# Patient Record
Sex: FEMALE | Race: BLACK OR AFRICAN AMERICAN | NOT HISPANIC OR LATINO | Employment: FULL TIME | URBAN - METROPOLITAN AREA
[De-identification: names, ages, dates, MRNs, and addresses within clinical notes are randomized per-mention and may not be internally consistent; named-entity substitution may affect disease eponyms.]

---

## 2018-03-03 ENCOUNTER — HISTORICAL (OUTPATIENT)
Dept: ADMINISTRATIVE | Facility: HOSPITAL | Age: 23
End: 2018-03-03

## 2018-03-03 LAB
HAV IGM SERPL QL IA: NONREACTIVE
HBV CORE IGM SERPL QL IA: NONREACTIVE
HBV SURFACE AG SERPL QL IA: NEGATIVE
HCV AB SERPL QL IA: NONREACTIVE
HIV 1+2 AB+HIV1 P24 AG SERPL QL IA: NONREACTIVE
POC BETA-HCG (QUAL): NEGATIVE
T PALLIDUM AB SER QL: NONREACTIVE

## 2018-08-23 ENCOUNTER — HISTORICAL (OUTPATIENT)
Dept: ADMINISTRATIVE | Facility: HOSPITAL | Age: 23
End: 2018-08-23

## 2018-08-23 LAB
CHOLEST SERPL-MCNC: 169 MG/DL
CHOLEST/HDLC SERPL: 2.6 {RATIO} (ref 0–4.4)
EST. AVERAGE GLUCOSE BLD GHB EST-MCNC: 103 MG/DL
HAV IGM SERPL QL IA: NONREACTIVE
HBA1C MFR BLD: 5.2 % (ref 4.2–6.3)
HBV CORE IGM SERPL QL IA: NONREACTIVE
HBV SURFACE AG SERPL QL IA: NEGATIVE
HCV AB SERPL QL IA: NONREACTIVE
HDLC SERPL-MCNC: 64 MG/DL
HIV 1+2 AB+HIV1 P24 AG SERPL QL IA: NONREACTIVE
LDLC SERPL CALC-MCNC: 89 MG/DL (ref 0–130)
POC BETA-HCG (QUAL): NEGATIVE
T PALLIDUM AB SER QL: NONREACTIVE
T4 FREE SERPL-MCNC: 1.08 NG/DL (ref 0.76–1.46)
TRIGL SERPL-MCNC: 82 MG/DL
TSH SERPL-ACNC: 1.26 MIU/L (ref 0.36–3.74)
VLDLC SERPL CALC-MCNC: 16 MG/DL

## 2018-10-03 LAB — POC BETA-HCG (QUAL): NEGATIVE

## 2019-01-23 LAB — POC BETA-HCG (QUAL): NEGATIVE

## 2019-02-22 ENCOUNTER — HISTORICAL (OUTPATIENT)
Dept: ADMINISTRATIVE | Facility: HOSPITAL | Age: 24
End: 2019-02-22

## 2019-02-22 LAB
ABS NEUT (OLG): 3.54 X10(3)/MCL (ref 2.1–9.2)
ALBUMIN SERPL-MCNC: 3.7 GM/DL (ref 3.4–5)
ALBUMIN/GLOB SERPL: 0.9 RATIO (ref 1.1–2)
ALP SERPL-CCNC: 57 UNIT/L (ref 45–117)
ALT SERPL-CCNC: 19 UNIT/L (ref 12–78)
APTT PPP: 27.4 SECOND(S) (ref 23.3–37)
AST SERPL-CCNC: 15 UNIT/L (ref 15–37)
BASOPHILS # BLD AUTO: 0.02 X10(3)/MCL
BASOPHILS NFR BLD AUTO: 0 %
BILIRUB SERPL-MCNC: 0.4 MG/DL (ref 0.2–1)
BILIRUBIN DIRECT+TOT PNL SERPL-MCNC: 0.1 MG/DL
BILIRUBIN DIRECT+TOT PNL SERPL-MCNC: 0.3 MG/DL
BUN SERPL-MCNC: 8 MG/DL (ref 7–18)
CALCIUM SERPL-MCNC: 8.8 MG/DL (ref 8.5–10.1)
CHLORIDE SERPL-SCNC: 108 MMOL/L (ref 98–107)
CO2 SERPL-SCNC: 25 MMOL/L (ref 21–32)
CREAT SERPL-MCNC: 0.8 MG/DL (ref 0.6–1.3)
EOSINOPHIL # BLD AUTO: 0.17 X10(3)/MCL
EOSINOPHIL NFR BLD AUTO: 2 %
ERYTHROCYTE [DISTWIDTH] IN BLOOD BY AUTOMATED COUNT: 11.9 % (ref 11.5–14.5)
GLOBULIN SER-MCNC: 4.3 GM/ML (ref 2.3–3.5)
GLUCOSE SERPL-MCNC: 74 MG/DL (ref 74–106)
HCT VFR BLD AUTO: 40 % (ref 35–46)
HGB BLD-MCNC: 13.7 GM/DL (ref 12–16)
IMM GRANULOCYTES # BLD AUTO: 0.02 10*3/UL
IMM GRANULOCYTES NFR BLD AUTO: 0 %
INR PPP: 1 (ref 0.9–1.2)
LYMPHOCYTES # BLD AUTO: 2.8 X10(3)/MCL
LYMPHOCYTES NFR BLD AUTO: 40 % (ref 13–40)
MCH RBC QN AUTO: 31.2 PG (ref 26–34)
MCHC RBC AUTO-ENTMCNC: 34.3 GM/DL (ref 31–37)
MCV RBC AUTO: 91.1 FL (ref 80–100)
MONOCYTES # BLD AUTO: 0.42 X10(3)/MCL
MONOCYTES NFR BLD AUTO: 6 % (ref 4–12)
NEUTROPHILS # BLD AUTO: 3.54 X10(3)/MCL
NEUTROPHILS NFR BLD AUTO: 51 X10(3)/MCL
PHOSPHOLIPID AB COM-LC: NORMAL
PHOSPHOLIPID AB INT-LC: NEGATIVE
PLATELET # BLD AUTO: 295 X10(3)/MCL (ref 130–400)
PMV BLD AUTO: 9.7 FL (ref 7.4–10.4)
POTASSIUM SERPL-SCNC: 3.6 MMOL/L (ref 3.5–5.1)
PROT SERPL-MCNC: 8 GM/DL (ref 6.4–8.2)
PROTHROMBIN TIME: 13.1 SECOND(S) (ref 11.9–14.4)
RBC # BLD AUTO: 4.39 X10(6)/MCL (ref 4–5.2)
SODIUM SERPL-SCNC: 138 MMOL/L (ref 136–145)
WBC # SPEC AUTO: 7 X10(3)/MCL (ref 4.5–11)

## 2019-06-27 ENCOUNTER — HISTORICAL (OUTPATIENT)
Dept: RADIOLOGY | Facility: HOSPITAL | Age: 24
End: 2019-06-27

## 2019-10-04 LAB — POC BETA-HCG (QUAL): NEGATIVE

## 2019-11-09 ENCOUNTER — HISTORICAL (OUTPATIENT)
Dept: ADMINISTRATIVE | Facility: HOSPITAL | Age: 24
End: 2019-11-09

## 2019-11-09 LAB
APPEARANCE, UA: ABNORMAL
BACTERIA #/AREA URNS AUTO: ABNORMAL /HPF
BILIRUB SERPL-MCNC: NEGATIVE MG/DL
BILIRUB UR QL STRIP: NEGATIVE
BLOOD URINE, POC: NEGATIVE
CLARITY, POC UA: CLEAR
COLOR UR: YELLOW
COLOR, POC UA: YELLOW
GLUCOSE (UA): NEGATIVE
GLUCOSE UR QL STRIP: NEGATIVE
HGB UR QL STRIP: NEGATIVE
HYALINE CASTS #/AREA URNS LPF: ABNORMAL /LPF
KETONES UR QL STRIP: NEGATIVE
KETONES UR QL STRIP: NEGATIVE
LEUKOCYTE EST, POC UA: NORMAL
LEUKOCYTE ESTERASE UR QL STRIP: 75 LEU/UL
NITRITE UR QL STRIP: NEGATIVE
NITRITE, POC UA: NEGATIVE
PH UR STRIP: 8 [PH] (ref 4.5–8)
PH, POC UA: 8.5
POC BETA-HCG (QUAL): NEGATIVE
PROT UR QL STRIP: NEGATIVE
PROTEIN, POC: NEGATIVE
RBC #/AREA URNS AUTO: ABNORMAL /HPF
SP GR UR STRIP: 1.02 (ref 1–1.03)
SPECIFIC GRAVITY, POC UA: 1.02
SQUAMOUS #/AREA URNS LPF: >100 /LPF
UROBILINOGEN UR STRIP-ACNC: NORMAL
UROBILINOGEN, POC UA: NORMAL
WBC #/AREA URNS AUTO: ABNORMAL /HPF

## 2019-11-11 LAB — FINAL CULTURE: NORMAL

## 2020-02-12 ENCOUNTER — HISTORICAL (OUTPATIENT)
Dept: ADMINISTRATIVE | Facility: HOSPITAL | Age: 25
End: 2020-02-12

## 2020-02-15 LAB — FINAL CULTURE: NORMAL

## 2020-02-26 LAB — POC BETA-HCG (QUAL): NEGATIVE

## 2020-09-24 ENCOUNTER — HISTORICAL (OUTPATIENT)
Dept: ADMINISTRATIVE | Facility: HOSPITAL | Age: 25
End: 2020-09-24

## 2020-09-24 LAB
BILIRUB SERPL-MCNC: NEGATIVE MG/DL
BLOOD URINE, POC: NORMAL
CLARITY, POC UA: CLEAR
COLOR, POC UA: COLORLESS
GLUCOSE UR QL STRIP: NEGATIVE
HAV IGM SERPL QL IA: NONREACTIVE
HBV CORE IGM SERPL QL IA: NONREACTIVE
HBV SURFACE AG SERPL QL IA: NONREACTIVE
HCV AB SERPL QL IA: NONREACTIVE
HIV 1+2 AB+HIV1 P24 AG SERPL QL IA: NONREACTIVE
KETONES UR QL STRIP: NEGATIVE
LEUKOCYTE EST, POC UA: NORMAL
NITRITE, POC UA: NEGATIVE
PH, POC UA: 6.5
PROTEIN, POC: NEGATIVE
SPECIFIC GRAVITY, POC UA: 1
T PALLIDUM AB SER QL: NONREACTIVE
UROBILINOGEN, POC UA: NORMAL

## 2020-11-10 ENCOUNTER — HISTORICAL (OUTPATIENT)
Dept: LAB | Facility: HOSPITAL | Age: 25
End: 2020-11-10

## 2020-11-10 LAB
ABS NEUT (OLG): 3.22 X10(3)/MCL (ref 2.1–9.2)
ALBUMIN SERPL-MCNC: 4.2 GM/DL (ref 3.5–5)
ALBUMIN/GLOB SERPL: 1.4 RATIO (ref 1.1–2)
ALP SERPL-CCNC: 68 UNIT/L (ref 40–150)
ALT SERPL-CCNC: 22 UNIT/L (ref 0–55)
AST SERPL-CCNC: 18 UNIT/L (ref 5–34)
BASOPHILS NFR BLD MANUAL: 1 %
BILIRUB SERPL-MCNC: 0.2 MG/DL
BILIRUBIN DIRECT+TOT PNL SERPL-MCNC: 0.1 MG/DL (ref 0–0.5)
BILIRUBIN DIRECT+TOT PNL SERPL-MCNC: 0.1 MG/DL (ref 0–0.8)
BUN SERPL-MCNC: 8 MG/DL (ref 7–18.7)
CALCIUM SERPL-MCNC: 9.3 MG/DL (ref 8.4–10.2)
CHLORIDE SERPL-SCNC: 104 MMOL/L (ref 98–107)
CO2 SERPL-SCNC: 26 MMOL/L (ref 22–29)
CREAT SERPL-MCNC: 0.74 MG/DL (ref 0.55–1.02)
EOSINOPHIL NFR BLD MANUAL: 1 %
ERYTHROCYTE [DISTWIDTH] IN BLOOD BY AUTOMATED COUNT: 12.1 % (ref 11.5–14.5)
GLOBULIN SER-MCNC: 3 GM/DL (ref 2.4–3.5)
GLUCOSE SERPL-MCNC: 96 MG/DL (ref 74–100)
HCT VFR BLD AUTO: 41.8 % (ref 35–46)
HGB BLD-MCNC: 13.9 GM/DL (ref 12–16)
LYMPHOCYTES NFR BLD MANUAL: 66 % (ref 20.5–51.1)
MCH RBC QN AUTO: 32 PG (ref 26–34)
MCHC RBC AUTO-ENTMCNC: 33.3 GM/DL (ref 31–37)
MCV RBC AUTO: 96.3 FL (ref 80–100)
MONOCYTES NFR BLD MANUAL: 4 % (ref 2–9)
NEUTROPHILS NFR BLD MANUAL: 28 % (ref 47–80)
PLATELET # BLD AUTO: 260 X10(3)/MCL (ref 130–400)
PLATELET # BLD EST: NORMAL 10*3/UL
PMV BLD AUTO: 10.2 FL (ref 7.4–10.4)
POTASSIUM SERPL-SCNC: 3.9 MMOL/L (ref 3.5–5.1)
PROT SERPL-MCNC: 7.2 GM/DL (ref 6.4–8.3)
RBC # BLD AUTO: 4.34 X10(6)/MCL (ref 4–5.2)
RBC MORPH BLD: NORMAL
SODIUM SERPL-SCNC: 138 MMOL/L (ref 136–145)
WBC # SPEC AUTO: 8.9 X10(3)/MCL (ref 4.5–11)

## 2020-12-21 ENCOUNTER — HISTORICAL (OUTPATIENT)
Dept: ADMINISTRATIVE | Facility: HOSPITAL | Age: 25
End: 2020-12-21

## 2020-12-21 LAB
BILIRUB SERPL-MCNC: NEGATIVE MG/DL
BLOOD URINE, POC: NEGATIVE
CLARITY, POC UA: CLEAR
COLOR, POC UA: COLORLESS
FLUAV AG UPPER RESP QL IA.RAPID: NEGATIVE
FLUBV AG UPPER RESP QL IA.RAPID: NEGATIVE
GLUCOSE UR QL STRIP: NEGATIVE
KETONES UR QL STRIP: NEGATIVE
LEUKOCYTE EST, POC UA: NEGATIVE
NITRITE, POC UA: NEGATIVE
PH, POC UA: 6.5
PROTEIN, POC: NEGATIVE
SARS-COV-2 RNA RESP QL NAA+PROBE: DETECTED
SPECIFIC GRAVITY, POC UA: 1.01
UROBILINOGEN, POC UA: NORMAL

## 2021-01-06 ENCOUNTER — HISTORICAL (OUTPATIENT)
Dept: ADMINISTRATIVE | Facility: HOSPITAL | Age: 26
End: 2021-01-06

## 2021-01-06 LAB
APPEARANCE, UA: CLEAR
BACTERIA #/AREA URNS AUTO: ABNORMAL /HPF
BILIRUB UR QL STRIP: NEGATIVE
COLOR UR: ABNORMAL
DEPRECATED CALCIDIOL+CALCIFEROL SERPL-MC: 20 NG/ML (ref 30–80)
GLUCOSE (UA): NEGATIVE
HAV IGM SERPL QL IA: NONREACTIVE
HBV CORE IGM SERPL QL IA: NONREACTIVE
HBV SURFACE AG SERPL QL IA: NONREACTIVE
HCV AB SERPL QL IA: NONREACTIVE
HGB UR QL STRIP: NEGATIVE
HIV 1+2 AB+HIV1 P24 AG SERPL QL IA: NONREACTIVE
HYALINE CASTS #/AREA URNS LPF: ABNORMAL /LPF
KETONES UR QL STRIP: NEGATIVE
LEUKOCYTE ESTERASE UR QL STRIP: NEGATIVE
NITRITE UR QL STRIP: NEGATIVE
PH UR STRIP: 8 [PH] (ref 4.5–8)
POC BETA-HCG (QUAL): NEGATIVE
PROT UR QL STRIP: NEGATIVE
RBC #/AREA URNS AUTO: ABNORMAL /HPF
SP GR UR STRIP: 1 (ref 1–1.03)
SQUAMOUS #/AREA URNS LPF: ABNORMAL /LPF
T PALLIDUM AB SER QL: NONREACTIVE
T4 FREE SERPL-MCNC: 1.05 NG/DL (ref 0.7–1.48)
TSH SERPL-ACNC: 1.32 UIU/ML (ref 0.35–4.94)
UROBILINOGEN UR STRIP-ACNC: NORMAL
WBC #/AREA URNS AUTO: ABNORMAL /HPF

## 2021-01-27 ENCOUNTER — HISTORICAL (OUTPATIENT)
Dept: ADMINISTRATIVE | Facility: HOSPITAL | Age: 26
End: 2021-01-27

## 2021-01-27 LAB
BILIRUB SERPL-MCNC: NEGATIVE MG/DL
BLOOD URINE, POC: NORMAL
CLARITY, POC UA: CLEAR
COLOR, POC UA: COLORLESS
GLUCOSE UR QL STRIP: NEGATIVE
KETONES UR QL STRIP: NEGATIVE
LEUKOCYTE EST, POC UA: NORMAL
NITRITE, POC UA: NEGATIVE
PH, POC UA: 5.5
POC BETA-HCG (QUAL): NEGATIVE
PROTEIN, POC: NEGATIVE
SPECIFIC GRAVITY, POC UA: 1
UROBILINOGEN, POC UA: NORMAL

## 2021-01-30 LAB — FINAL CULTURE: NORMAL

## 2021-03-16 LAB — SARS-COV-2 RNA RESP QL NAA+PROBE: NEGATIVE

## 2021-05-26 ENCOUNTER — HISTORICAL (OUTPATIENT)
Dept: ADMINISTRATIVE | Facility: HOSPITAL | Age: 26
End: 2021-05-26

## 2021-05-26 LAB — SARS-COV-2 RNA RESP QL NAA+PROBE: NOT DETECTED

## 2021-07-13 ENCOUNTER — HISTORICAL (OUTPATIENT)
Dept: ADMINISTRATIVE | Facility: HOSPITAL | Age: 26
End: 2021-07-13

## 2021-07-13 LAB
HAV IGM SERPL QL IA: NONREACTIVE
HBV CORE IGM SERPL QL IA: NONREACTIVE
HBV SURFACE AG SERPL QL IA: NONREACTIVE
HCV AB SERPL QL IA: NONREACTIVE
HIV 1+2 AB+HIV1 P24 AG SERPL QL IA: NONREACTIVE
T PALLIDUM AB SER QL: NONREACTIVE

## 2021-07-16 ENCOUNTER — HISTORICAL (OUTPATIENT)
Dept: ADMINISTRATIVE | Facility: HOSPITAL | Age: 26
End: 2021-07-16

## 2021-07-16 LAB — HBV CORE IGM SERPL QL IA: NONREACTIVE

## 2021-08-15 ENCOUNTER — HISTORICAL (OUTPATIENT)
Dept: ADMINISTRATIVE | Facility: HOSPITAL | Age: 26
End: 2021-08-15

## 2021-08-15 LAB
HBV SURFACE AB SER-ACNC: 0.34 MIU/ML
HBV SURFACE AB SERPL IA-ACNC: NONREACTIVE M[IU]/ML

## 2021-10-31 ENCOUNTER — HISTORICAL (OUTPATIENT)
Dept: ADMINISTRATIVE | Facility: HOSPITAL | Age: 26
End: 2021-10-31

## 2021-10-31 LAB
BILIRUB SERPL-MCNC: NEGATIVE MG/DL
BLOOD URINE, POC: NEGATIVE
CLARITY, POC UA: CLEAR
COLOR, POC UA: YELLOW
GLUCOSE UR QL STRIP: NEGATIVE
KETONES UR QL STRIP: NEGATIVE
LEUKOCYTE EST, POC UA: NORMAL
NITRITE, POC UA: NEGATIVE
PH, POC UA: 7
POC BETA-HCG (QUAL): NEGATIVE
PROTEIN, POC: NEGATIVE
SPECIFIC GRAVITY, POC UA: 1.02
UROBILINOGEN, POC UA: NORMAL

## 2021-11-02 LAB — FINAL CULTURE: NO GROWTH

## 2022-04-11 ENCOUNTER — HISTORICAL (OUTPATIENT)
Dept: ADMINISTRATIVE | Facility: HOSPITAL | Age: 27
End: 2022-04-11

## 2022-04-26 VITALS
WEIGHT: 238.31 LBS | BODY MASS INDEX: 42.23 KG/M2 | HEIGHT: 63 IN | SYSTOLIC BLOOD PRESSURE: 117 MMHG | OXYGEN SATURATION: 100 % | DIASTOLIC BLOOD PRESSURE: 71 MMHG

## 2022-05-04 NOTE — HISTORICAL [facility] CERNER
This is a historical note converted from [employer]. Formatting and pictures may have been removed.  Please reference [employer]icolette for original formatting and attached multimedia. Chief Complaint  frequent urine and odor for 4 days  History of Present Illness  25-year-old female presents to clinic complaining of?urinary urgency frequency?and odor for the past?4 days. ?Also reports suprapubic pressure.? Patient would also like a pregnancy test because?she states during sexual intercourse 4 to 5 days ago?the condom broke.? Patient is on birth control but states that her periods have been irregular?and she so?this causes concern for her.? Denies any vaginal discharge.  Review of Systems  Constitutional: negative except as stated in HPI  ?Eye: negative except as stated in HPI  ?ENT: negative except as stated in HPI  ?Respiratory: negative except as stated in HPI  ?Cardiovascular: negative except as stated in HPI  ?Gastrointestinal: negative except as stated in HPI  ?Genitourinary: negative except as stated in HPI  ?Hema/Lymph: negative except as stated in HPI  ?Endocrine: negative except as stated in HPI  ?Immunologic: negative except as stated in HPI  ?Musculoskeletal: negative except as stated in HPI  ?Integumentary: negative except as stated in HPI  ?Neurologic: negative except as stated in HPI  ?All Other ROS_ negative except as stated in HPI  Physical Exam  Vitals & Measurements  T:?36.9? ?C (Oral)? HR:?60(Peripheral)? RR:?20? BP:?110/66? SpO2:?100%?  HT:?160.00?cm? WT:?101.200?kg? BMI:?39.53?  General_well-developed well-nourished in no acute distress  Gastrointestinal_mild suprapubic tenderness  Genitourinary_no CVA tenderness to palpation  Integumentary_no rashes or skin lesions present?  ?  Assessment/Plan  1.?UTI (urinary tract infection)?N39.0  Start the antibiotics today. ?We will culture your urine and call you with the results when they become available. ?Rest and hydrate. ?If your symptoms persist, worsen or you  develop fever, back pain, or belly pain seek medical attention immediately  Ordered:  nitrofurantoin, 100 mg = 1 cap(s), Oral, BID, X 7 day(s), # 14 cap(s), 0 Refill(s), Pharmacy: [facility]/pharmacy #[id number], 160, cm, Height/Length Dosing, 01/27/21 17:11:00 CST, 101.2, kg, Weight Dosing, 01/27/21 17:11:00 CST  Michael Souza, Trich vag [id number], Now collect, Urine, 01/27/21 17:31:00 CST, Stop date 01/27/21 17:31:00 CST, Nurse collect, UTI (urinary tract infection), 01/27/21 17:31:00 CST  Urine Culture [id number], Routine collect, 01/27/21 17:31:00 CST, Urine, Nurse collect, Stop date 01/27/21 17:31:00 CST, UTI (urinary tract infection)  ?  2.?Encounter for screening for bacterial sexually transmitted disease?Z11.3  ?We will call you with the results when they become available  ?   Problem List/Past Medical History  Ongoing  2019-nCoV  DVT - Deep vein thrombosis  Obesity  Historical  DVT - Deep vein thrombosis of lower limb  Procedure/Surgical History  Tonsillectomy and adenoidectomy   Medications  Sandee 0.35 mg oral tablet, See Instructions, 11 refills  Macrobid 100 mg oral capsule, 100 mg= 1 cap(s), Oral, BID  Allergies  penicillin?(hives)  Bactrim?(hives)  clindamycin?(severe itching,dizziness, nausea)  Social History  Abuse/Neglect  No, No, Yes, 01/27/2021  Alcohol  Current, Wine, 1-2 times per month, Alcohol use interferes with work or home: No. Others hurt by drinking: No. Household alcohol concerns: No., 11/09/2019  Employment/School  Employed, 01/23/2019  Exercise  Exercise duration: 60. Exercise frequency: 5-6 times/week., 01/23/2019  Home/Environment  Lives with Father, Mother, Siblings. Living situation: Home/Independent. Alcohol abuse in household: No. Substance abuse in household: No. Smoker in household: No. Injuries/Abuse/Neglect in household: No. Feels unsafe at home: Yes. Safe place to go: Yes. Family/Friends available for support: Yes. Concern for family members at home: No. Major illness in  household: No. Financial concerns: No. TV/Computer concerns: No., 01/23/2019    Never in , 01/27/2021  Nutrition/Health  Type of diet: good. Regular, Caffeine intake amount: rarely. Diet restrictions: no. Vitamin/Supplements: yes. Wants to lose weight: Yes. Sleeping concerns: Yes. Feels highly stressed: Yes., 01/23/2019  Sexual  Sexually active: Yes. Sexually active at age 17 Years. Number of current partners 1. Number of lifetime partners 14. Sexual orientation: Straight or heterosexual. Uses condoms: Yes. History of sexual abuse: No. Gender Identity Identifies as female., 01/23/2019  Spiritual/Cultural  Moravian, 06/13/2019  Substance Use - Denies Substance Abuse, 04/09/2015  Current, Marijuana, 1-2 times per week, 12/21/2020  Tobacco - Denies Tobacco Use, 04/09/2015  Never (less than 100 in lifetime), N/A, 01/27/2021  Family History  ALS: Negative: Father.  Asthma.: Negative: Father.  Bipolar: Negative: Father.  CAD (coronary artery disease): Negative: Father.  COPD: Negative: Father.  Cancer: Negative: Father.  Cataract.: Negative: Father.  DM (diabetes mellitus): Negative: Father.  Depression.: Negative: Father.  Heart attack: Negative: Father.  Hyperlipidemia: Negative: Father.  Hyperlipidemia.: Grandmother.  Hypertension.: Grandfather.Negative: Father.  Kidney failure: Negative: Father.  Liver cirrhosis: Negative: Father.  Lupus: Negative: Father.  Mental retardation: Negative: Father.  Open heart surgery: Grandfather.  Seizure: Negative: Father.  Sickle cell disease.: Negative: Father.  Stroke: Negative: Father.  TB: Negative: Father.  Tobacco use.: Negative: Father.  Immunizations  Vaccine Date Status   influenza virus vaccine, inactivated 01/10/2017 Given   meningococcal conjugate vaccine 02/23/2012 Recorded   influenza virus vaccine, inactivated 01/26/2012 Recorded   human papillomavirus vaccine 10/11/2011 Recorded   human papillomavirus vaccine 07/12/2011 Recorded   human papillomavirus  vaccine 04/12/2011 Recorded   varicella virus vaccine 02/11/2011 Recorded   varicella virus vaccine 01/13/2011 Recorded   tetanus/diphtheria/pertussis, acel(Tdap) 01/13/2011 Recorded   influenza virus vaccine, inactivated 11/02/2010 Recorded   influenza virus vaccine, inactivated 11/03/2009 Recorded   hepatitis A pediatric vaccine 04/09/2008 Recorded   meningococcal conjugate vaccine 01/25/2008 Recorded   poliovirus vaccine, inactivated 03/16/2000 Recorded   measles/mumps/rubella virus vaccine 03/16/2000 Recorded   diphtheria/pertussis, acel/tetanus ped 03/16/2000 Recorded   poliovirus vaccine, inactivated 02/26/1997 Recorded   measles/mumps/rubella virus vaccine 02/26/1997 Recorded   poliovirus vaccine, inactivated 1996 Recorded   hepatitis B pediatric vaccine 1996 Recorded   poliovirus vaccine, inactivated 1996 Recorded   poliovirus vaccine, inactivated 1996 Recorded   hepatitis B pediatric vaccine 1996 Recorded   hepatitis B pediatric vaccine 1996 Recorded   Health Maintenance  Health Maintenance  ???Pending?(in the next year)  ??? ??OverDue  ??? ? ? ?Influenza Vaccine due??10/01/20??and every 1??day(s)  ??? ??Due?  ??? ? ? ?Tetanus Vaccine due??01/13/21??and every 10??year(s)  ??? ??Due In Future?  ??? ? ? ?ADL Screening not due until??12/21/21??and every 1??year(s)  ??? ? ? ?Obesity Screening not due until??01/01/22??and every 1??year(s)  ??? ? ? ?Alcohol Misuse Screening not due until??01/02/22??and every 1??year(s)  ??? ? ? ?Depression Screening not due until??01/06/22??and every 1??year(s)  ???Satisfied?(in the past 1 year)  ??? ??Satisfied?  ??? ? ? ?ADL Screening on??12/21/20.??Satisfied by Lurdes Iglesias LPN  ??? ? ? ?Alcohol Misuse Screening on??01/27/21.??Satisfied by Herlinda Joshi LPN  ??? ? ? ?Blood Pressure Screening on??01/27/21.??Satisfied by Herlinda Joshi LPN  ??? ? ? ?Body Mass Index Check on??01/27/21.??Satisfied by Herlinda Joshi LPN  ??? ? ?  ?Cervical Cancer Screening on??01/06/21.??Satisfied by Kailey Crowe  ??? ? ? ?Depression Screening on??01/06/21.??Satisfied by Eleni Juarez LPN  ??? ? ? ?Diabetes Screening on??11/10/20.??Satisfied by Shaheen Gardner Jr.  ??? ? ? ?Influenza Vaccine on??01/27/21.??Satisfied by Herlinda Joshi LPN  ??? ? ? ?Obesity Screening on??01/27/21.??Satisfied by Herlinda Joshi LPN  ?

## 2022-05-04 NOTE — HISTORICAL [facility] CERNER
This is a historical note converted from [employer]. Formatting and pictures may have been removed.  Please reference [place] for original formatting and attached multimedia. Chief Complaint  Cramping and back pain for 4 days  History of Present Illness  Patient presents to Urgent Care for stated complaint during Covid health crisis.  26-year-old female presents to clinic reporting of a?lower bilateral back pain?that started 3 days ago?as well urinary pressure?and lower abdominal discomfort,?patient?reporting she might feel a UTI,?also she reports she has been exercising lately?and she is thinking?the back pain related to exercising?but denies any incontinence or bowel habit changes, denies any fever or nausea vomiting or diarrhea  Review of Systems  Constitutional: negative except as stated in HPI  Eye: negative except as stated in HPI  ENT: negative except as stated in HPI  Respiratory: negative except as stated in HPI  Cardiovascular: negative except as stated in HPI  Gastrointestinal: negative except as stated in HPI  Genitourinary: negative except as stated in HPI  Hema/Lymph: negative except as stated in HPI  Endocrine: negative except as stated in HPI  Immunologic: negative except as stated in HPI  Musculoskeletal: negative except as stated in HPI  Integumentary: negative except as stated in HPI  Neurologic: negative except as stated in HPI  Physical Exam  Vitals & Measurements  T:?37.1? ?C (Oral)? HR:?65(Peripheral)? RR:?20? BP:?117/69? SpO2:?100%?  HT:?160.00?cm? WT:?104.000?kg? BMI:?40.63? LMP:?09/01/2021 00:00 CDT?  Vital signs: Reviewed  General: in no apparent distress, appropriate for age  Head: no signs of head trauma  Eyes: pupils are reactive. ?Extraocular motions intact, conjunctival pink.?  ENT: Bilateral ear canal clear, no edema, no discharge or bleeding. ?Bilateral TMs intact, pearly gray, no effusion, no retraction or perforation or bulging.  Nose: no sinus tenderness, nasal turbinate normal no  erythema, clear nasal discharge.  Mouth: posterior pharynx-clear, uvula midline.  Neck :no adenopathy, supple, full range of motion, nontender  Respiratory: clear breath sounds bilaterally. ?No wheezing  Cardiac :regular, no murmur  Gastrointestinal: Soft, nontender  Musculoskeletal: Moves all extremities, ambulatory without assistant, spinal vertebral nontender no crepitus no step-off, lumbar region paraspinal muscle skeletal tenderness with palpation, no edema no ecchymosis  :?See HPI  Integumentary: No rashes or skin lesions visible  Neurologic: Cranial nerves grossly intact, no signs of peripheral neurological deficit  Assessment/Plan  1.?Pain in the abdomen?R10.9  Discussed physical exam findings and test results with patient?urine within normal limit but will send for culture due to symptoms,?declined pain medication in the clinic, Rx diclofenac 50 mg 3 times daily as needed for pain, tizanidine?8 mg at bedtime, Macrobid?twice daily for 5 days?will notify of culture results if positive, discussed medication prescribed and its use  Instructed patient to notify his primary care provider regarding the visit today and schedule follow-up appointment in 2 to 3 days if needed  Instructed patient to come back to clinic or go to emergency room department if symptom worsens or no improvement or for any other reasons  Questions elicited and answered  Patient verbalized understanding of discharge instructions, verbalized understand medication prescribed and its use, will read discharge education instructions  Ordered:  nitrofurantoin, 100 mg = 1 cap(s), Oral, BID, X 5 day(s), # 10 cap(s), 0 Refill(s), Pharmacy: [facility]/pharmacy #[id number], Patient Education Provided, Patient Verbalizes Understanding, 160, cm, Height/Length Dosing, 10/31/21 12:34:00 CDT, 104, kg, Weight Dosing, 10/31/21 12:3...  Office/Outpatient Visit Level 4 Established [id number] PC, Pain in the abdomen  Back pain, 10/31/21 13:09:00 CDT  Urine Culture [id number], Now  collect, 10/31/21 13:02:00 CDT, Urine, Nurse collect, Stop date 10/31/21 13:03:00 CDT, Back pain  Pain in the abdomen  ?  2.?Back pain?M54.9,?Back pain?M54.9  ?As discussed in #1  Ordered:  diclofenac, 50 mg = 1 tab(s), Oral, TID, PRN PRN as needed for pain, with food, # 21 tab(s), 0 Refill(s), Pharmacy: [facility]/pharmacy #[id number], Patient Education Provided, Patient Verbalizes Understanding, 160, cm, Height/Length Dosing, 10/31/21 12:34:00 CDT, 104, kg, We...  nitrofurantoin, 100 mg = 1 cap(s), Oral, BID, X 5 day(s), # 10 cap(s), 0 Refill(s), Pharmacy: [facility]/pharmacy #[id number], Patient Education Provided, Patient Verbalizes Understanding, 160, cm, Height/Length Dosing, 10/31/21 12:34:00 CDT, 104, kg, Weight Dosing, 10/31/21 12:3...  tiZANidine, 8 mg = 2 tab(s), Oral, At Bedtime, PRN PRN pain, Do not take while driving or operating machinery or at work or school might cause sleepiness dizziness or drowsiness, # 14 tab(s), 0 Refill(s), Pharmacy: [facility]/pharmacy #[id number], Patient Education Provided,...  Office/Outpatient Visit Level 4 Established [id number] PC, Pain in the abdomen  Back pain, 10/31/21 13:09:00 CDT  Urine Culture [id number], Now collect, 10/31/21 13:02:00 CDT, Urine, Nurse collect, Stop date 10/31/21 13:03:00 CDT, Back pain  Pain in the abdomen  ?   Problem List/Past Medical History  Ongoing  2019-nCoV  DVT - Deep vein thrombosis  Obesity  Historical  DVT - Deep vein thrombosis of lower limb  Procedure/Surgical History  Tonsillectomy and adenoidectomy   Medications  diclofenac sodium 50 mg oral delayed release tablet, 50 mg= 1 tab(s), Oral, TID, PRN  Sandee 0.35 mg oral tablet, See Instructions, 11 refills  Macrobid 100 mg oral capsule, 100 mg= 1 cap(s), Oral, BID  montelukast 10 mg oral TABLET, 10 mg= 1 tab(s), Oral, Daily  tiZANidine 4 mg oral tablet, 8 mg= 2 tab(s), Oral, At Bedtime, PRN  Allergies  penicillin?(hives)  Bactrim?(hives)  clindamycin?(severe itching,dizziness, nausea)  Social History  Abuse/Neglect  No, No, Yes,  10/31/2021  Alcohol  Current, Wine, 1-2 times per month, Alcohol use interferes with work or home: No. Others hurt by drinking: No. Household alcohol concerns: No., 11/09/2019  Employment/School  Employed, 01/23/2019  Exercise  Exercise duration: 60. Exercise frequency: 5-6 times/week., 01/23/2019  Home/Environment  Lives with Father, Mother, Siblings. Living situation: Home/Independent. Alcohol abuse in household: No. Substance abuse in household: No. Smoker in household: No. Injuries/Abuse/Neglect in household: No. Feels unsafe at home: Yes. Safe place to go: Yes. Family/Friends available for support: Yes. Concern for family members at home: No. Major illness in household: No. Financial concerns: No. TV/Computer concerns: No., 01/23/2019    Never in , 01/27/2021  Nutrition/Health  Regular, Good, 08/16/2021  Sexual  Sexually active: Yes. Sexually active at age 17 Years. Number of current partners 1. Number of lifetime partners 14. Sexual orientation: Straight or heterosexual. Uses condoms: Yes. History of sexual abuse: No. Gender Identity Identifies as female., 01/23/2019  Spiritual/Cultural  Jainism, 06/13/2019  Substance Use - Denies Substance Abuse, 04/09/2015  Current, Marijuana, Several times per day, 03/16/2021  Tobacco - Denies Tobacco Use, 04/09/2015  Never (less than 100 in lifetime), N/A, 10/31/2021  Family History  ALS: Negative: Father.  Asthma.: Negative: Father.  Bipolar: Negative: Father.  CAD (coronary artery disease): Negative: Father.  COPD: Negative: Father.  Cancer: Negative: Father.  Cataract.: Negative: Father.  DM (diabetes mellitus): Negative: Father.  Depression.: Negative: Father.  Heart attack: Negative: Father.  Hyperlipidemia: Negative: Father.  Hyperlipidemia.: Grandmother.  Hypertension.: Grandfather.Negative: Father.  Kidney failure: Negative: Father.  Liver cirrhosis: Negative: Father.  Lupus: Negative: Father.  Mental retardation: Negative: Father.  Open heart  surgery: Grandfather.  Seizure: Negative: Father.  Sickle cell disease.: Negative: Father.  Stroke: Negative: Father.  TB: Negative: Father.  Tobacco use.: Negative: Father.  Immunizations  Vaccine Date Status   hepatitis B adult vaccine 09/20/2021 Given   hepatitis B adult vaccine 08/16/2021 Given   tuberculin purified protein derivative 07/19/2021 Given   tetanus/diphtheria/pertussis, acel(Tdap) 07/16/2021 Given   COVID-19 MRNA, LNP-S, PF- Pfizer 06/26/2021 Recorded   COVID-19 MRNA, LNP-S, PF- Pfizer 06/05/2021 Recorded   influenza virus vaccine, inactivated 01/10/2017 Given   meningococcal conjugate vaccine 02/23/2012 Recorded   influenza virus vaccine, inactivated 01/26/2012 Recorded   human papillomavirus vaccine 10/11/2011 Recorded   human papillomavirus vaccine 07/12/2011 Recorded   human papillomavirus vaccine 04/12/2011 Recorded   varicella virus vaccine 02/11/2011 Recorded   varicella virus vaccine 01/13/2011 Recorded   tetanus/diphtheria/pertussis, acel(Tdap) 01/13/2011 Recorded   influenza virus vaccine, inactivated 11/02/2010 Recorded   influenza virus vaccine, inactivated 11/03/2009 Recorded   hepatitis A pediatric vaccine 04/09/2008 Recorded   meningococcal conjugate vaccine 01/25/2008 Recorded   poliovirus vaccine, inactivated 03/16/2000 Recorded   measles/mumps/rubella virus vaccine 03/16/2000 Recorded   diphtheria/pertussis, acel/tetanus ped 03/16/2000 Recorded   poliovirus vaccine, inactivated 02/26/1997 Recorded   measles/mumps/rubella virus vaccine 02/26/1997 Recorded   poliovirus vaccine, inactivated 1996 Recorded   hepatitis B pediatric vaccine 1996 Recorded   poliovirus vaccine, inactivated 1996 Recorded   poliovirus vaccine, inactivated 1996 Recorded   hepatitis B pediatric vaccine 1996 Recorded   hepatitis B pediatric vaccine 1996 Recorded   Health Maintenance  Health Maintenance  ???Pending?(in the next year)  ???There are no current recommendations  pending  ??? ??Due In Future?  ??? ? ? ?Obesity Screening not due until??01/01/22??and every 1??year(s)  ??? ? ? ?Alcohol Misuse Screening not due until??01/02/22??and every 1??year(s)  ??? ? ? ?ADL Screening not due until??08/16/22??and every 1??year(s)  ???Satisfied?(in the past 1 year)  ??? ??Satisfied?  ??? ? ? ?ADL Screening on??08/16/21.??Satisfied by Kayleigh Barrios LPN  ??? ? ? ?Alcohol Misuse Screening on??01/27/21.??Satisfied by Herlinda Joshi LPN.  ??? ? ? ?Blood Pressure Screening on??10/31/21.??Satisfied by Serenity Joshi LPNe JOSIE.  ??? ? ? ?Body Mass Index Check on??10/31/21.??Satisfied by Herlinda Joshi LPN.  ??? ? ? ?Cervical Cancer Screening on??01/06/21.??Satisfied by Kailey Crowe  ??? ? ? ?Depression Screening on??08/16/21.??Satisfied by Kayleigh Barrios LPN  ??? ? ? ?Diabetes Screening on??11/10/20.??Satisfied by Shaheen Gardner Jr.  ??? ? ? ?Influenza Vaccine on??10/31/21.??Satisfied by Herlinda Joshi LPN.  ??? ? ? ?Obesity Screening on??10/31/21.??Satisfied by Serenity Joshi LPNe D.  ??? ? ? ?Tetanus Vaccine on??07/16/21.??Satisfied by Patrick GROSS, Mitul BLUE  ?

## 2022-05-04 NOTE — HISTORICAL [facility]ER
This is a historical note converted from [employer]. Formatting and pictures may have been removed.  Please reference [employer] for original formatting and attached multimedia. Chief Complaint  lower back pain  History of Present Illness  25 yo BF presents to clinic c/o lower back pain. States was working out last night and thinks she overdid it a little bit. Pain with movement, twisting, bending, and lateral bending. Denies fever, urinary symptoms. Denies abdominal pain. Feels soreness to abdomen from working out. Denies taking meds for pain.  [facility]  Review of Systems  General: denies fatigue, fever, chills  GI: denies N/V, diarrhea, bowel incontinence, abd pain  : denies retention, incontinence, frequency, urgency, dysuria, hematuria, hx renal calculi  MS: reports limitation in movement, denies numbness or tingling in lower extremities, denies weakness in lower extremities  ?  ?  ?  Physical Exam  Vitals & Measurements  T:?37.2? ?C (Oral)? HR:?114(Peripheral)? HR:?73(Apical)? RR:?18?  HT:?160?cm? WT:?101.7?kg? BMI:?39.73? LMP:?09/09/2019 00:00 CDT?  General: awake, alert, NADN  Skin: pink, warm, dry and intact  Abd: flat, BSA, NT to palp  Back: pt is able to ambulate to treatment area without assistance.? There is no surface trauma. No abrasions, scars, ecchymosis, lacerations.?+spasm ,?no mass; no PT, step-off or deformity of the bony cervical, thoracic, or L-Spine to firm palpation at the midline. Pain to palpation paravertebral on Left lower lumbar region.  No CVA tenderness to percussion, no SI notch tenderness, no saddle anesthesia.  ROM--able to stand erect  Normal flexion, extension, lateral bending and rotation with limitation?and complaint of pain  Heel and toe walk with good strength.  Dorsi-and plantar flexion with adequate strength.  Straight leg?raises are negative for radiculopathy.  Pain is not in buttock nor does it?radiate  Good dorsalis pedal pulses and posterior tibial  pulses  Sensation to light touch is intact at the great toe web space  N/V intact to lower extremities; strength 5/5  ?  Assessment/Plan  1.?Strain of fascia of lower back?S39.012A  Urine dip=blood (-), (-) glucose, nitrite (-), trace leuk est  Urine pregnancy=negative  Naproxen 500mg (no longer taking Eliquis has been off for a while) po BID x 7 days-do not take Motrin with this medication.  Flexeril 5mg po TID prn spasm  ER precautions for neurological deficits or worsening of pain after 1-2 weeks  Cryotherapy to back.  ROM exercises for back stretching.? Always stretch prior to working out.   Problem List/Past Medical History  Ongoing  DVT - Deep vein thrombosis  Obesity  TMJ tenderness  Tobacco user  Historical  DVT - Deep vein thrombosis of lower limb  Procedure/Surgical History  denies   Medications  Eliquis Starter Pack for Treatment of DVT and PE 5 mg oral tablet, 5 mg= 1 tab(s), Oral, BID,? ?Not Taking per Prescriber  Ortho Micronor 0.35 mg oral tablet, 0.35 mg= 1 tab(s), Oral, Daily, 11 refills  Promethazine DM 6.25 mg-15 mg/5 mL oral syrup, 5 mL, Oral, q6hr, PRN  Allergies  penicillin?(hives)  Bactrim?(hives)  clindamycin?(severe itching,dizziness, nausea)  Social History  Abuse/Neglect  No, 11/23/2019  No, No, 10/11/2019  No, 10/04/2019  Alcohol  Current, Wine, 1-2 times per month, Alcohol use interferes with work or home: No. Others hurt by drinking: No. Household alcohol concerns: No., 11/09/2019  Employment/School  Employed, 01/23/2019  Exercise  Exercise duration: 60. Exercise frequency: 5-6 times/week., 01/23/2019  Home/Environment  Lives with Father, Mother, Siblings. Living situation: Home/Independent. Alcohol abuse in household: No. Substance abuse in household: No. Smoker in household: No. Injuries/Abuse/Neglect in household: No. Feels unsafe at home: Yes. Safe place to go: Yes. Family/Friends available for support: Yes. Concern for family members at home: No. Major illness in household: No.  Financial concerns: No. TV/Computer concerns: No., 01/23/2019  Nutrition/Health  Type of diet: good. Regular, Caffeine intake amount: rarely. Diet restrictions: no. Vitamin/Supplements: yes. Wants to lose weight: Yes. Sleeping concerns: Yes. Feels highly stressed: Yes., 01/23/2019  Sexual  Sexually active: Yes. Sexually active at age 17 Years. Number of current partners 1. Number of lifetime partners 14. Sexual orientation: Straight or heterosexual. Uses condoms: Yes. History of sexual abuse: No. Gender Identity Identifies as female., 01/23/2019  Spiritual/Cultural  Mormon, 06/13/2019  Substance Use - Denies Substance Abuse, 04/09/2015  Current, Marijuana, 10/11/2019  Current, Marijuana, 03/26/2019  Current, Marijuana, Daily, 01/23/2019  Tobacco - Denies Tobacco Use, 04/09/2015  Never (less than 100 in lifetime), N/A, 11/23/2019  Never (less than 100 in lifetime), N/A, Household tobacco concerns: No., 11/09/2019  Family History  ALS: Negative: Father.  Asthma.: Negative: Father.  Bipolar: Negative: Father.  CAD (coronary artery disease): Negative: Father.  COPD: Negative: Father.  Cancer: Negative: Father.  Cataract.: Negative: Father.  DM (diabetes mellitus): Negative: Father.  Depression.: Negative: Father.  Heart attack: Negative: Father.  Hyperlipidemia: Negative: Father.  Hypertension.: Negative: Father.  Kidney failure: Negative: Father.  Liver cirrhosis: Negative: Father.  Lupus: Negative: Father.  Mental retardation: Negative: Father.  Seizure: Negative: Father.  Sickle cell disease.: Negative: Father.  Stroke: Negative: Father.  TB: Negative: Father.  Tobacco use.: Negative: Father.  Immunizations  Vaccine Date Status   influenza virus vaccine, inactivated 01/10/2017 Given   Health Maintenance  Health Maintenance  ???Pending?(in the next year)  ??? ??OverDue  ??? ? ? ?Diabetes Screening due??and every?  ??? ? ? ?Smoking Cessation due??01/01/19??and every 1??year(s)  ??? ??Due?  ??? ? ? ?Influenza Vaccine  due??11/27/19??and every?  ??? ? ? ?Tetanus Vaccine due??11/27/19??and every 10??year(s)  ??? ??Postponed?  ??? ? ? ?Alcohol Misuse Screening due??03/26/20??and every 1??year(s)  ??? ??Due In Future?  ??? ? ? ?Obesity Screening not due until??01/01/20??and every 1??year(s)  ??? ? ? ?ADL Screening not due until??03/01/20??and every 1??year(s)  ??? ? ? ?Blood Pressure Screening not due until??10/03/20??and every 1??year(s)  ??? ? ? ?Depression Screening not due until??10/03/20??and every 1??year(s)  ??? ? ? ?Body Mass Index Check not due until??11/22/20??and every 1??year(s)  ???Satisfied?(in the past 1 year)  ??? ??Satisfied?  ??? ? ? ?ADL Screening on??03/01/19.??Satisfied by Christel Santizo LPN  ??? ? ? ?Blood Pressure Screening on??11/23/19.??Satisfied by Karen Chaudhry RN  ??? ? ? ?Body Mass Index Check on??11/23/19.??Satisfied by Nilo Lancaster  ??? ? ? ?Depression Screening on??10/04/19.??Satisfied by Dao Cardenas LPN  ??? ? ? ?Diabetes Screening on??02/22/19.??Satisfied by Yaz Barrera  ??? ? ? ?Influenza Vaccine on??10/04/19.??Satisfied by Dao Cardenas LPN  ??? ? ? ?Obesity Screening on??11/23/19.??Satisfied by Nilo Lancaster  ??? ??Postponed?  ??? ? ? ?Alcohol Misuse Screening on??03/26/19.??Recorded by Roberto Garner LPN??Reason: Temporary contraindication - reschedule  ?  Lab Results  Test Name Test Result Date/Time   Urine Color Urine Dipstick Yellow 11/09/2019 14:14 CST   Urine Appearance Urine Dipstick Clear 11/09/2019 14:14 CST   pH Urine Dipstick 8.5 11/09/2019 14:14 CST   Specific Gravity Urine Dipstick 1.020 11/09/2019 14:14 CST   Blood Urine Dipstick Negative 11/09/2019 14:14 CST   Glucose Urine Dipstick Negative 11/09/2019 14:14 CST   Ketones Urine Dipstick Negative 11/09/2019 14:14 CST   Protein Urine Dipstick Negative 11/09/2019 14:14 CST   Bilirubin Urine Dipstick Negative 11/09/2019 14:14 CST   Urobilinogen Urine Dipstick 0.2 mg/dl 11/09/2019 14:14 CST   Leukocytes Urine  Dipstick Trace 11/09/2019 14:14 CST   Nitrite Urine Dipstick Negative 11/09/2019 14:14 CST   U beta hCG Ql POC Negative 11/09/2019 14:56 CST

## 2022-06-26 ENCOUNTER — OFFICE VISIT (OUTPATIENT)
Dept: URGENT CARE | Facility: CLINIC | Age: 27
End: 2022-06-26
Payer: MEDICAID

## 2022-06-26 VITALS
HEART RATE: 96 BPM | HEIGHT: 63 IN | TEMPERATURE: 100 F | WEIGHT: 244.88 LBS | RESPIRATION RATE: 18 BRPM | SYSTOLIC BLOOD PRESSURE: 135 MMHG | DIASTOLIC BLOOD PRESSURE: 85 MMHG | BODY MASS INDEX: 43.39 KG/M2 | OXYGEN SATURATION: 100 %

## 2022-06-26 DIAGNOSIS — J06.9 ACUTE URI: Primary | ICD-10-CM

## 2022-06-26 DIAGNOSIS — R68.89 FLU-LIKE SYMPTOMS: ICD-10-CM

## 2022-06-26 DIAGNOSIS — Z11.52 ENCOUNTER FOR SCREENING FOR COVID-19: ICD-10-CM

## 2022-06-26 DIAGNOSIS — J02.9 SORE THROAT: ICD-10-CM

## 2022-06-26 LAB
CTP QC/QA: YES
CTP QC/QA: YES
FLUAV AG NPH QL: NEGATIVE
FLUBV AG NPH QL: NEGATIVE
SARS-COV-2 RDRP RESP QL NAA+PROBE: NEGATIVE
STREP A PCR (OHS): NOT DETECTED

## 2022-06-26 PROCEDURE — 99213 OFFICE O/P EST LOW 20 MIN: CPT | Mod: S$PBB,,, | Performed by: NURSE PRACTITIONER

## 2022-06-26 PROCEDURE — 87631 RESP VIRUS 3-5 TARGETS: CPT | Performed by: NURSE PRACTITIONER

## 2022-06-26 PROCEDURE — 87804 INFLUENZA ASSAY W/OPTIC: CPT | Mod: 59,PBBFAC | Performed by: NURSE PRACTITIONER

## 2022-06-26 PROCEDURE — 99213 PR OFFICE/OUTPT VISIT, EST, LEVL III, 20-29 MIN: ICD-10-PCS | Mod: S$PBB,,, | Performed by: NURSE PRACTITIONER

## 2022-06-26 PROCEDURE — U0002 COVID-19 LAB TEST NON-CDC: HCPCS | Mod: PBBFAC | Performed by: NURSE PRACTITIONER

## 2022-06-26 PROCEDURE — 99213 OFFICE O/P EST LOW 20 MIN: CPT | Mod: PBBFAC | Performed by: NURSE PRACTITIONER

## 2022-06-26 NOTE — PROGRESS NOTES
"Subjective:       Patient ID: Ninfa Fernandez is a 27 y.o. female.    Vitals:  height is 5' 2.99" (1.6 m) and weight is 111.1 kg (244 lb 14.4 oz). Her oral temperature is 99.5 °F (37.5 °C). Her blood pressure is 135/85 and her pulse is 96. Her respiration is 18 and oxygen saturation is 100%.     Chief Complaint: Sore Throat (Entered by patient), Chills, and Generalized Body Aches    Patient is a 27-year-old female, here today for sore throat, nasal congestion, body aches x1 day.  Not taking anything for symptoms.  States she has montelukast and nose spray at home.  The      Constitution: Positive for chills.   HENT: Positive for congestion.    Neck: neck negative.   Cardiovascular: Negative.    Respiratory: Negative.        Objective:      Physical Exam   Constitutional: She is oriented to person, place, and time. She appears well-developed.   HENT:   Head: Normocephalic.   Ears:   Right Ear: Tympanic membrane normal.   Left Ear: Tympanic membrane normal.   Nose: Congestion present.   Eyes: Conjunctivae and EOM are normal. Pupils are equal, round, and reactive to light.   Neck: Neck supple.   Cardiovascular: Normal rate, regular rhythm and normal heart sounds.   Pulmonary/Chest: Effort normal and breath sounds normal.   Musculoskeletal: Normal range of motion.         General: Normal range of motion.   Neurological: She is alert and oriented to person, place, and time.   Skin: Skin is warm and dry.   Psychiatric: Her behavior is normal.   Vitals reviewed.        Assessment:       1. Acute URI    2. Encounter for screening for COVID-19    3. Flu-like symptoms    4. Sore throat            Office Visit on 06/26/2022   Component Date Value Ref Range Status    POC Rapid COVID 06/26/2022 Negative  Negative Final     Acceptable 06/26/2022 Yes   Final    Rapid Influenza A Ag 06/26/2022 Negative  Negative Final    Rapid Influenza B Ag 06/26/2022 Negative  Negative Final     Acceptable " 06/26/2022 Yes   Final        No results found.   Plan:           Medication as ordered. May use humidifier.  If any shortness of breath, wheezing, continued fevers or any new symptoms then immediately go to ER.    Acute URI    Encounter for screening for COVID-19  -     POCT COVID-19 Rapid Screening    Flu-like symptoms  -     POCT Influenza A/B    Sore throat  -     Strep Group A by PCR

## 2022-06-26 NOTE — LETTER
June 26, 2022      [facility]  [address] [address]  Phone: [phone]       Patient: Ninfa Fernandez   YOB: 1995  Date of Visit: 06/26/2022    To Whom It May Concern:    Mike Fernandez  was at [facility] on 06/26/2022. The patient may return to work/school on 06/27/2022 with no restrictions. If you have any questions or concerns, or if I can be of further assistance, please do not hesitate to contact me.    Sincerely,    Luciana Giordano LPN

## 2022-07-30 ENCOUNTER — OFFICE VISIT (OUTPATIENT)
Dept: URGENT CARE | Facility: CLINIC | Age: 27
End: 2022-07-30
Payer: MEDICAID

## 2022-07-30 VITALS
SYSTOLIC BLOOD PRESSURE: 106 MMHG | DIASTOLIC BLOOD PRESSURE: 69 MMHG | TEMPERATURE: 99 F | HEIGHT: 64 IN | BODY MASS INDEX: 42.38 KG/M2 | OXYGEN SATURATION: 100 % | HEART RATE: 66 BPM | RESPIRATION RATE: 18 BRPM | WEIGHT: 248.25 LBS

## 2022-07-30 DIAGNOSIS — Z11.52 ENCOUNTER FOR SCREENING FOR COVID-19: ICD-10-CM

## 2022-07-30 DIAGNOSIS — R10.9 ABDOMINAL PAIN, UNSPECIFIED ABDOMINAL LOCATION: Primary | ICD-10-CM

## 2022-07-30 DIAGNOSIS — R11.0 NAUSEA: ICD-10-CM

## 2022-07-30 LAB
B-HCG UR QL: NEGATIVE
BILIRUB UR QL STRIP: NEGATIVE
CTP QC/QA: YES
FLUAV AG NPH QL: NEGATIVE
FLUBV AG NPH QL: NEGATIVE
GLUCOSE UR QL STRIP: NEGATIVE
KETONES UR QL STRIP: NEGATIVE
LEUKOCYTE ESTERASE UR QL STRIP: NEGATIVE
PH, POC UA: 6.5
POC BLOOD, URINE: NEGATIVE
POC NITRATES, URINE: NEGATIVE
PROT UR QL STRIP: NEGATIVE
SARS-COV-2 RDRP RESP QL NAA+PROBE: NEGATIVE
SP GR UR STRIP: 1.02 (ref 1–1.03)
UROBILINOGEN UR STRIP-ACNC: 0.2 (ref 0.1–1.1)

## 2022-07-30 PROCEDURE — 81025 URINE PREGNANCY TEST: CPT | Mod: PBBFAC

## 2022-07-30 PROCEDURE — 87804 INFLUENZA ASSAY W/OPTIC: CPT | Mod: PBBFAC

## 2022-07-30 PROCEDURE — 99213 OFFICE O/P EST LOW 20 MIN: CPT | Mod: S$PBB,,,

## 2022-07-30 PROCEDURE — U0002 COVID-19 LAB TEST NON-CDC: HCPCS | Mod: PBBFAC

## 2022-07-30 PROCEDURE — 99213 PR OFFICE/OUTPT VISIT, EST, LEVL III, 20-29 MIN: ICD-10-PCS | Mod: S$PBB,,,

## 2022-07-30 PROCEDURE — 99214 OFFICE O/P EST MOD 30 MIN: CPT | Mod: PBBFAC

## 2022-07-30 PROCEDURE — 81003 URINALYSIS AUTO W/O SCOPE: CPT | Mod: PBBFAC

## 2022-07-30 RX ORDER — DICYCLOMINE HYDROCHLORIDE 10 MG/1
10 CAPSULE ORAL 2 TIMES DAILY PRN
Qty: 6 CAPSULE | Refills: 0 | Status: SHIPPED | OUTPATIENT
Start: 2022-07-30 | End: 2022-08-02

## 2022-07-30 RX ORDER — ONDANSETRON 4 MG/1
4 TABLET, ORALLY DISINTEGRATING ORAL EVERY 8 HOURS PRN
Qty: 10 TABLET | Refills: 0 | Status: SHIPPED | OUTPATIENT
Start: 2022-07-30 | End: 2022-08-02

## 2022-07-30 NOTE — LETTER
July 30, 2022      [facility]  [address] [address]  Phone: [phone]       Patient: Ninfa Fernandez   YOB: 1995  Date of Visit: 07/30/2022    To Whom It May Concern:    Mike Fernandez  was at [facility] on 07/30/2022. The patient may return to work/school on AUG 1 2022 with no restrictions. If you have any questions or concerns, or if I can be of further assistance, please do not hesitate to contact me.    Sincerely,    BRANDYN MONTENEGRO NP

## 2022-07-30 NOTE — PROGRESS NOTES
"Subjective:       Patient ID: Ninfa Fernandez is a 27 y.o. female.    Vitals:  height is 5' 4.17" (1.63 m) and weight is 112.6 kg (248 lb 3.8 oz). Her temperature is 98.6 °F (37 °C). Her blood pressure is 106/69 and her pulse is 66. Her respiration is 18 and oxygen saturation is 100%.     Chief Complaint: Abdominal Pain (X today), Nausea, and Diarrhea    27 year old female states stomach cramping, nausea and diarrhea starting today. Denies vomiting or fever. States eating a sandwich this morning from a fast food restaurant and pain after.     Abdominal Pain  Associated symptoms include diarrhea and nausea. Pertinent negatives include no vomiting.   Nausea  Associated symptoms include abdominal pain and nausea. Pertinent negatives include no vomiting.   Diarrhea   Associated symptoms include abdominal pain. Pertinent negatives include no vomiting.       Constitution: Positive for appetite change.   HENT: Negative.    Eyes: Negative.    Respiratory: Negative.    Gastrointestinal: Positive for abdominal pain, nausea and diarrhea. Negative for vomiting.   Endocrine: negative.   Genitourinary: Negative.    Musculoskeletal: Negative.    Skin: Negative.        Objective:      Physical Exam   Constitutional: obesity  HENT:   Head: Normocephalic.   Mouth/Throat: Mucous membranes are moist. Oropharynx is clear.   Eyes: Pupils are equal, round, and reactive to light.   Cardiovascular: Normal rate, regular rhythm and normal pulses.   Pulmonary/Chest: Effort normal and breath sounds normal.   Abdominal: Normal appearance and bowel sounds are normal. She exhibits no distension and no mass. Soft. There is no abdominal tenderness. There is no rebound, no guarding, no left CVA tenderness and no right CVA tenderness. No hernia.   Musculoskeletal: Normal range of motion.         General: Normal range of motion.   Neurological: She is alert.   Skin: Skin is warm and dry.   Psychiatric: Mood normal.   Vitals reviewed.        Results " for orders placed or performed in visit on 07/30/22   POCT Urinalysis, Dipstick, Automated, W/O Scope   Result Value Ref Range    POC Blood, Urine Negative Negative    POC Bilirubin, Urine Negative Negative    POC Urobilinogen, Urine 0.2 0.1 - 1.1    POC Ketones, Urine Negative Negative    POC Protein, Urine Negative Negative    POC Nitrates, Urine Negative Negative    POC Glucose, Urine Negative Negative    pH, UA 6.5     POC Specific Gravity, Urine 1.020 1.003 - 1.029    POC Leukocytes, Urine Negative Negative   POCT urine pregnancy   Result Value Ref Range    POC Preg Test, Ur Negative Negative     Acceptable Yes    POCT COVID-19 Rapid Screening   Result Value Ref Range    POC Rapid COVID Negative Negative     Acceptable Yes    POCT Influenza A/B   Result Value Ref Range    Rapid Influenza A Ag Negative Negative    Rapid Influenza B Ag Negative Negative     Acceptable Yes        Assessment:       1. Abdominal pain, unspecified abdominal location    2. Nausea    3. Encounter for screening for COVID-19          Plan:         Abdominal pain, unspecified abdominal location  -     POCT Urinalysis, Dipstick, Automated, W/O Scope  -     POCT urine pregnancy  -     dicyclomine (BENTYL) 10 MG capsule; Take 1 capsule (10 mg total) by mouth 2 (two) times daily as needed (stomach cramps).  Dispense: 6 capsule; Refill: 0    Nausea  -     POCT Influenza A/B  -     ondansetron (ZOFRAN-ODT) 4 MG TbDL; Take 1 tablet (4 mg total) by mouth every 8 (eight) hours as needed (nausea).  Dispense: 10 tablet; Refill: 0    Encounter for screening for COVID-19  -     POCT COVID-19 Rapid Screening        See patient education for more guidance.    Prescription meds were sent for you - wait 8 hours from the time you left our clinic to take another dose of Ondansetron.     Sanitize your bathroom and all shared surfaces with BLEACH as frequently as you can while ill.     Start back with WATER only.  When you can keep water down for 2 hours, you may try powerade, gatorade, pedialyte or electrolyte beverage of choice.    Do not attempt to eat until this evening, and when you do - follow BLAND diet listed above, today and tomorrow.      Please see provided patient education for guidance.    Go to the ER if you experience chest pain with SOB, SOBOE, high fevers 103+, excessive vomiting/diarrhea, or general distress.

## 2022-09-14 ENCOUNTER — OFFICE VISIT (OUTPATIENT)
Dept: URGENT CARE | Facility: CLINIC | Age: 27
End: 2022-09-14
Payer: MEDICAID

## 2022-09-14 VITALS
SYSTOLIC BLOOD PRESSURE: 121 MMHG | HEART RATE: 59 BPM | OXYGEN SATURATION: 99 % | TEMPERATURE: 98 F | RESPIRATION RATE: 16 BRPM | HEIGHT: 63 IN | BODY MASS INDEX: 43.39 KG/M2 | WEIGHT: 244.88 LBS | DIASTOLIC BLOOD PRESSURE: 79 MMHG

## 2022-09-14 DIAGNOSIS — N92.6 MISSED PERIOD: Primary | ICD-10-CM

## 2022-09-14 DIAGNOSIS — Z11.1 VISIT FOR TB SKIN TEST: ICD-10-CM

## 2022-09-14 DIAGNOSIS — Z02.0 SCHOOL PHYSICAL EXAM: ICD-10-CM

## 2022-09-14 DIAGNOSIS — Z32.00 ENCOUNTER FOR PREGNANCY TEST, RESULT UNKNOWN: ICD-10-CM

## 2022-09-14 LAB
B-HCG UR QL: NEGATIVE
CTP QC/QA: YES

## 2022-09-14 PROCEDURE — 99213 PR OFFICE/OUTPT VISIT, EST, LEVL III, 20-29 MIN: ICD-10-PCS | Mod: S$PBB,,, | Performed by: NURSE PRACTITIONER

## 2022-09-14 PROCEDURE — 99213 OFFICE O/P EST LOW 20 MIN: CPT | Mod: PBBFAC | Performed by: NURSE PRACTITIONER

## 2022-09-14 PROCEDURE — 99213 OFFICE O/P EST LOW 20 MIN: CPT | Mod: S$PBB,,, | Performed by: NURSE PRACTITIONER

## 2022-09-14 PROCEDURE — 81025 URINE PREGNANCY TEST: CPT | Mod: PBBFAC | Performed by: NURSE PRACTITIONER

## 2022-09-14 PROCEDURE — 86580 TB INTRADERMAL TEST: CPT | Mod: PBBFAC

## 2022-09-14 NOTE — PROGRESS NOTES
"Subjective:       Patient ID: Ninfa Fernandez is a 27 y.o. female.    Vitals:  height is 5' 2.99" (1.6 m) and weight is 111.1 kg (244 lb 14.4 oz). Her temperature is 98.2 °F (36.8 °C). Her blood pressure is 121/79 and her pulse is 59 (abnormal). Her respiration is 16 and oxygen saturation is 99%.     Chief Complaint: TB test, Hep B titers    She is attending [facility] and needs a TB skin test and Hep B titers. She is also requesting a flu vaccine if available.     She is requesting a pregnancy test. She has not had a period since she stopped taking birthcontrol a few months ago.        Constitution: Negative for activity change, appetite change and fever.   HENT:  Negative for ear pain, congestion and sore throat.    Eyes:  Negative for eye discharge.   Respiratory:  Negative for cough, shortness of breath and wheezing.    Gastrointestinal:  Negative for abdominal pain, nausea, vomiting, constipation and diarrhea.   Genitourinary:  Positive for missed menses (since stopping birth control a few months ago). Negative for dysuria, frequency, urgency and urine decreased.   Musculoskeletal:  Negative for pain.   Skin:  Negative for rash.   Neurological:  Negative for dizziness and headaches.     Objective:      Physical Exam   Constitutional: She is oriented to person, place, and time.  Non-toxic appearance. No distress. obesity  HENT:   Head: Normocephalic and atraumatic.   Nose: Nose normal. No rhinorrhea or congestion.   Mouth/Throat: Mucous membranes are moist. No oropharyngeal exudate or posterior oropharyngeal erythema. Oropharynx is clear.   Eyes: Pupils are equal, round, and reactive to light.   Neck: Neck supple.   Cardiovascular: Normal rate, regular rhythm and normal pulses.   No murmur heard.  Pulmonary/Chest: Effort normal and breath sounds normal. No respiratory distress.   Abdominal: Normal appearance and bowel sounds are normal. She exhibits no distension. Soft. There is no abdominal tenderness. "   Musculoskeletal: Normal range of motion.         General: Normal range of motion.      Cervical back: She exhibits no tenderness.   Lymphadenopathy:     She has no cervical adenopathy.   Neurological: no focal deficit. She is alert and oriented to person, place, and time.   Skin: Skin is warm and dry. Capillary refill takes less than 2 seconds.   Nursing note and vitals reviewed.      Assessment:       1. Missed period    2. School physical exam    3. Visit for TB skin test    4. Encounter for pregnancy test, result unknown          Plan:         Missed period  -     POCT urine pregnancy    School physical exam  -     Hepatitis B Surface Antibody, Qual/Quant; Future; Expected date: 09/14/2022    Visit for TB skin test  -     POCT TB Skin Test Read  -You must return to clinic in 48-72 hours to have your TB skin test read.   Encounter for pregnancy test, result unknown  -     POCT urine pregnancy       Results for orders placed or performed in visit on 09/14/22   POCT urine pregnancy   Result Value Ref Range    POC Preg Test, Ur Negative Negative     Acceptable Yes

## 2022-09-14 NOTE — PROGRESS NOTES
"Subjective:       Patient ID: Ninfa Fernandez is a 27 y.o. female.    Vitals:  height is 5' 2.99" (1.6 m) and weight is 111.1 kg (244 lb 14.4 oz). Her temperature is 98.2 °F (36.8 °C). Her blood pressure is 121/79 and her pulse is 59 (abnormal). Her respiration is 16 and oxygen saturation is 99%.     Chief Complaint: TB test, Hep B titers    She is attending [facility] and needs a TB Skin Test and Hep B titers. She is also requesting a flu vaccine if available.     She has not had a period in the past few months and she is requesting a pregnancy test.       Constitution: Negative for activity change, appetite change and fever.   HENT:  Negative for ear pain, congestion and sore throat.    Respiratory:  Negative for cough, shortness of breath and wheezing.    Gastrointestinal:  Negative for abdominal pain, nausea, vomiting, constipation and diarrhea.   Genitourinary:  Positive for missed menses (stopped birthcontrol a few months and has not had a period since then). Negative for dysuria, frequency, urgency and urine decreased.   Musculoskeletal:  Negative for pain.   Skin:  Negative for rash.   Neurological:  Negative for dizziness and headaches.     Objective:      Physical Exam   Constitutional: She is oriented to person, place, and time.  Non-toxic appearance. No distress. obesity  HENT:   Head: Normocephalic and atraumatic.   Nose: Nose normal. No rhinorrhea or congestion.   Mouth/Throat: Mucous membranes are moist. No oropharyngeal exudate or posterior oropharyngeal erythema. Oropharynx is clear.   Eyes: Pupils are equal, round, and reactive to light.   Neck: Neck supple.   Cardiovascular: Normal rate, regular rhythm and normal pulses.   No murmur heard.  Pulmonary/Chest: Effort normal and breath sounds normal. No respiratory distress.   Abdominal: Normal appearance and bowel sounds are normal. She exhibits no distension. Soft. There is no abdominal tenderness.   Musculoskeletal: Normal range of motion.         " General: Normal range of motion.      Cervical back: She exhibits no tenderness.   Lymphadenopathy:     She has no cervical adenopathy.   Neurological: no focal deficit. She is alert and oriented to person, place, and time.   Skin: Skin is warm and dry. Capillary refill takes less than 2 seconds.   Nursing note and vitals reviewed.      Assessment:       1. Missed period    2. School physical exam    3. Visit for TB skin test    4. Encounter for pregnancy test, result unknown          Plan:         Missed period  -     POCT urine pregnancy    School physical exam  -     Hepatitis B Surface Antibody, Qual/Quant    Visit for TB skin test  -     POCT TB Skin Test Read  -You must return to clinic in 48-72 hours to have your TB skin test read.     Encounter for pregnancy test, result unknown  -     POCT urine pregnancy       Results for orders placed or performed in visit on 09/14/22   POCT urine pregnancy   Result Value Ref Range    POC Preg Test, Ur Negative Negative     Acceptable Yes

## 2022-09-17 ENCOUNTER — OFFICE VISIT (OUTPATIENT)
Dept: URGENT CARE | Facility: CLINIC | Age: 27
End: 2022-09-17
Payer: MEDICAID

## 2022-09-17 VITALS
BODY MASS INDEX: 43.98 KG/M2 | SYSTOLIC BLOOD PRESSURE: 124 MMHG | HEIGHT: 63 IN | HEART RATE: 65 BPM | TEMPERATURE: 98 F | DIASTOLIC BLOOD PRESSURE: 81 MMHG | RESPIRATION RATE: 18 BRPM | OXYGEN SATURATION: 99 % | WEIGHT: 248.19 LBS

## 2022-09-17 DIAGNOSIS — J06.9 UPPER RESPIRATORY TRACT INFECTION, UNSPECIFIED TYPE: ICD-10-CM

## 2022-09-17 DIAGNOSIS — Z11.52 ENCOUNTER FOR SCREENING FOR COVID-19: Primary | ICD-10-CM

## 2022-09-17 DIAGNOSIS — R68.89 FLU-LIKE SYMPTOMS: ICD-10-CM

## 2022-09-17 LAB
CTP QC/QA: YES
CTP QC/QA: YES
FLUAV AG NPH QL: NEGATIVE
FLUBV AG NPH QL: NEGATIVE
SARS-COV-2 RDRP RESP QL NAA+PROBE: NEGATIVE
TB INDURATION - 48 HR READ: NORMAL
TB INDURATION - 72 HR READ: 0 MM
TB SKIN TEST - 48 HR READ: NORMAL
TB SKIN TEST - 72 HR READ: NEGATIVE

## 2022-09-17 PROCEDURE — U0002 COVID-19 LAB TEST NON-CDC: HCPCS | Mod: PBBFAC | Performed by: FAMILY MEDICINE

## 2022-09-17 PROCEDURE — 99214 OFFICE O/P EST MOD 30 MIN: CPT | Mod: S$PBB,,, | Performed by: FAMILY MEDICINE

## 2022-09-17 PROCEDURE — 87804 INFLUENZA ASSAY W/OPTIC: CPT | Mod: PBBFAC | Performed by: FAMILY MEDICINE

## 2022-09-17 PROCEDURE — 99214 PR OFFICE/OUTPT VISIT, EST, LEVL IV, 30-39 MIN: ICD-10-PCS | Mod: S$PBB,,, | Performed by: FAMILY MEDICINE

## 2022-09-17 PROCEDURE — 99213 OFFICE O/P EST LOW 20 MIN: CPT | Mod: PBBFAC | Performed by: FAMILY MEDICINE

## 2022-09-17 RX ORDER — PREDNISONE 20 MG/1
20 TABLET ORAL DAILY
Qty: 5 TABLET | Refills: 0 | Status: SHIPPED | OUTPATIENT
Start: 2022-09-17 | End: 2022-09-22

## 2022-09-17 NOTE — PROGRESS NOTES
"Subjective:       Patient ID: Ninfa Fernandez is a 27 y.o. female.    Chief Complaint: Headache (Entered by patient) and Facial Pain      HPI  28 yo female with HA and facial pain for 2 days. Has tried OTC medications with no improvement.  No known sick contacts.    Review of Systems   HENT:          As above       Objective:       Vital Signs  Temp: 98.3 °F (36.8 °C)  Temp src: Oral  Pulse: 65  Resp: 18  SpO2: 99 %  BP: 124/81  Height and Weight  Height: 5' 2.99" (160 cm)  Weight: 112.6 kg (248 lb 3.2 oz)  BSA (Calculated - sq m): 2.24 sq meters  BMI (Calculated): 44  Weight in (lb) to have BMI = 25: 140.8]  Physical Exam  Vitals reviewed.   Constitutional:       Appearance: Normal appearance.   HENT:      Head: Normocephalic and atraumatic.      Nose: Congestion present. No rhinorrhea.      Mouth/Throat:      Pharynx: Posterior oropharyngeal erythema present. No oropharyngeal exudate.   Eyes:      Extraocular Movements: Extraocular movements intact.      Conjunctiva/sclera: Conjunctivae normal.   Cardiovascular:      Rate and Rhythm: Normal rate and regular rhythm.      Heart sounds: Normal heart sounds.   Pulmonary:      Breath sounds: Normal breath sounds.   Musculoskeletal:      Cervical back: No tenderness.   Lymphadenopathy:      Cervical: No cervical adenopathy.   Skin:     General: Skin is warm and dry.   Neurological:      General: No focal deficit present.      Mental Status: She is alert.   Psychiatric:         Mood and Affect: Mood and affect normal.         Speech: Speech normal.         Behavior: Behavior normal. Behavior is cooperative.         Thought Content: Thought content does not include homicidal or suicidal ideation.       Assessment:       Problem List Items Addressed This Visit    None  Visit Diagnoses       Encounter for screening for COVID-19    -  Primary    Relevant Medications    predniSONE (DELTASONE) 20 MG tablet    Other Relevant Orders    POCT COVID-19 Rapid Screening (Completed) "    Flu-like symptoms        Relevant Orders    POCT Influenza A/B (Completed)    Upper respiratory tract infection, unspecified type                  Plan:           Swabs negative  Encouraged ibuprofen/acetaminophen as needed  ER precautions  FU with PCP

## 2022-09-17 NOTE — LETTER
September 17, 2022      [facility]  [address] [address]  Phone: [phone]       Patient: Ninfa Fernandez   YOB: 1995  Date of Visit: 09/17/2022    To Whom It May Concern:    Mike Fernandez  was at [facility] on 09/17/2022. The patient may return to work/school on 09/18/2022 with no restrictions. If you have any questions or concerns, or if I can be of further assistance, please do not hesitate to contact me.    Sincerely,    Surjit Duarte MD

## 2022-09-22 ENCOUNTER — HISTORICAL (OUTPATIENT)
Dept: ADMINISTRATIVE | Facility: HOSPITAL | Age: 27
End: 2022-09-22
Payer: MEDICAID

## 2022-10-21 ENCOUNTER — OFFICE VISIT (OUTPATIENT)
Dept: URGENT CARE | Facility: CLINIC | Age: 27
End: 2022-10-21
Payer: MEDICAID

## 2022-10-21 VITALS
BODY MASS INDEX: 42.41 KG/M2 | RESPIRATION RATE: 24 BRPM | WEIGHT: 248.44 LBS | OXYGEN SATURATION: 99 % | DIASTOLIC BLOOD PRESSURE: 76 MMHG | SYSTOLIC BLOOD PRESSURE: 127 MMHG | TEMPERATURE: 102 F | HEIGHT: 64 IN | HEART RATE: 95 BPM

## 2022-10-21 DIAGNOSIS — U07.1 COVID-19: ICD-10-CM

## 2022-10-21 DIAGNOSIS — R52 BODY ACHES: Primary | ICD-10-CM

## 2022-10-21 LAB
CTP QC/QA: YES
CTP QC/QA: YES
FLUAV AG NPH QL: NEGATIVE
FLUBV AG NPH QL: NEGATIVE
SARS-COV-2 RDRP RESP QL NAA+PROBE: POSITIVE

## 2022-10-21 PROCEDURE — 99213 OFFICE O/P EST LOW 20 MIN: CPT | Mod: PBBFAC | Performed by: FAMILY MEDICINE

## 2022-10-21 PROCEDURE — 99214 PR OFFICE/OUTPT VISIT, EST, LEVL IV, 30-39 MIN: ICD-10-PCS | Mod: S$PBB,,, | Performed by: FAMILY MEDICINE

## 2022-10-21 PROCEDURE — 99214 OFFICE O/P EST MOD 30 MIN: CPT | Mod: S$PBB,,, | Performed by: FAMILY MEDICINE

## 2022-10-21 PROCEDURE — 87635 SARS-COV-2 COVID-19 AMP PRB: CPT | Mod: PBBFAC | Performed by: FAMILY MEDICINE

## 2022-10-21 PROCEDURE — 87804 INFLUENZA ASSAY W/OPTIC: CPT | Mod: 59,PBBFAC | Performed by: FAMILY MEDICINE

## 2022-10-21 NOTE — LETTER
October 21, 2022      [facility]  [address] [address]  Phone: [phone]       Patient: Ninfa Fernandez   YOB: 1995  Date of Visit: 10/21/2022    To Whom It May Concern:    Mike Fernandez  was at [facility] on 10/21/2022. The patient may return to work/school on OCT 25 2022 with no restrictions. If you have any questions or concerns, or if I can be of further assistance, please do not hesitate to contact me.    Sincerely,    MANJU CRANDALL MD

## 2022-10-21 NOTE — PROGRESS NOTES
"Subjective:       Patient ID: Ninfa Fernandez is a 27 y.o. female.    Vitals:  height is 5' 4.17" (1.63 m) and weight is 112.7 kg (248 lb 7.3 oz). Her temperature is 102.4 °F (39.1 °C) (abnormal). Her blood pressure is 127/76 and her pulse is 95. Her respiration is 24 (abnormal) and oxygen saturation is 99%.     Chief Complaint: Dizziness, Generalized Body Aches (X 2days), and Fever    Dizziness:    Associated symptoms: a fever.  Fever     2 days of myalgias, fever, minimal cough, no sore throat.  Occasional dizziness.  Nursing student.    Constitution: Positive for fever.   Neurological:  Positive for dizziness.     Constitutional: negative except as stated in HPI  Eye: negative except as stated in HPI  ENT: negative except as stated in HPI  Respiratory: negative except as stated in HPI  Cardiovascular: negative except as stated in HPI  Gastrointestinal: negative except as stated in HPI  Genitourinary: negative except as stated in HPI  Objective:      Physical Exam   Constitutional: She appears well-developed.   HENT:   Head: Atraumatic.   Nose: No rhinorrhea or purulent discharge. Right sinus exhibits no maxillary sinus tenderness and no frontal sinus tenderness. Left sinus exhibits no maxillary sinus tenderness and no frontal sinus tenderness.   Mouth/Throat: Oropharynx is clear and moist.   Eyes: Conjunctivae are normal.   Neck: Neck supple.   Cardiovascular: Regular rhythm.   Pulmonary/Chest: Effort normal and breath sounds normal.   Lymphadenopathy:     She has no cervical adenopathy.   Neurological: She is alert.   Skin: Skin is warm and dry.   Nursing note and vitals reviewed.      Results for orders placed or performed in visit on 10/21/22   POCT COVID-19 Rapid Screening   Result Value Ref Range    POC Rapid COVID Positive (A) Negative     Acceptable Yes    POCT Influenza A/B   Result Value Ref Range    Rapid Influenza A Ag Negative Negative    Rapid Influenza B Ag Negative Negative    "  Acceptable Yes        Assessment:       1. Body aches    2. COVID-19            Plan:         Body aches  -     POCT COVID-19 Rapid Screening  -     POCT Influenza A/B    COVID-19         Discussed COVID 19 isolation instructions, contagious precautions.  Use symptomatic medications as needed.  ER precautions.  Please read patient education material.

## 2023-01-23 ENCOUNTER — OFFICE VISIT (OUTPATIENT)
Dept: FAMILY MEDICINE | Facility: CLINIC | Age: 28
End: 2023-01-23
Payer: MEDICAID

## 2023-01-23 VITALS
HEIGHT: 64 IN | TEMPERATURE: 98 F | HEART RATE: 68 BPM | WEIGHT: 253.5 LBS | OXYGEN SATURATION: 99 % | BODY MASS INDEX: 43.28 KG/M2 | DIASTOLIC BLOOD PRESSURE: 74 MMHG | SYSTOLIC BLOOD PRESSURE: 121 MMHG

## 2023-01-23 DIAGNOSIS — Z30.9 ENCOUNTER FOR CONTRACEPTIVE MANAGEMENT, UNSPECIFIED TYPE: Primary | ICD-10-CM

## 2023-01-23 DIAGNOSIS — G47.00 INSOMNIA, UNSPECIFIED TYPE: ICD-10-CM

## 2023-01-23 DIAGNOSIS — Z12.4 CERVICAL CANCER SCREENING: ICD-10-CM

## 2023-01-23 DIAGNOSIS — E66.01 CLASS 3 SEVERE OBESITY WITH BODY MASS INDEX (BMI) OF 40.0 TO 44.9 IN ADULT, UNSPECIFIED OBESITY TYPE, UNSPECIFIED WHETHER SERIOUS COMORBIDITY PRESENT: ICD-10-CM

## 2023-01-23 DIAGNOSIS — Z11.3 SCREENING EXAMINATION FOR STD (SEXUALLY TRANSMITTED DISEASE): ICD-10-CM

## 2023-01-23 PROBLEM — I82.409 DEEP VEIN THROMBOSIS (DVT): Status: ACTIVE | Noted: 2023-01-23

## 2023-01-23 PROBLEM — E66.9 OBESITY: Status: ACTIVE | Noted: 2023-01-23

## 2023-01-23 LAB
B-HCG UR QL: NEGATIVE
C TRACH DNA SPEC QL NAA+PROBE: NOT DETECTED
CLUE CELLS VAG QL WET PREP: ABNORMAL
CTP QC/QA: YES
HCV AB SERPL QL IA: NONREACTIVE
HIV 1+2 AB+HIV1 P24 AG SERPL QL IA: NONREACTIVE
N GONORRHOEA DNA SPEC QL NAA+PROBE: NOT DETECTED
T PALLIDUM AB SER QL: NONREACTIVE
T VAGINALIS VAG QL WET PREP: ABNORMAL
WBC #/AREA VAG WET PREP: ABNORMAL
YEAST SPEC QL WET PREP: ABNORMAL

## 2023-01-23 PROCEDURE — 87210 SMEAR WET MOUNT SALINE/INK: CPT

## 2023-01-23 PROCEDURE — 88174 CYTOPATH C/V AUTO IN FLUID: CPT

## 2023-01-23 PROCEDURE — 87389 HIV-1 AG W/HIV-1&-2 AB AG IA: CPT

## 2023-01-23 PROCEDURE — 81025 URINE PREGNANCY TEST: CPT | Mod: PBBFAC

## 2023-01-23 PROCEDURE — 87591 N.GONORRHOEAE DNA AMP PROB: CPT

## 2023-01-23 PROCEDURE — 36415 COLL VENOUS BLD VENIPUNCTURE: CPT

## 2023-01-23 PROCEDURE — 86803 HEPATITIS C AB TEST: CPT

## 2023-01-23 PROCEDURE — 99213 OFFICE O/P EST LOW 20 MIN: CPT | Mod: PBBFAC

## 2023-01-23 PROCEDURE — 86780 TREPONEMA PALLIDUM: CPT

## 2023-01-23 RX ORDER — ACETAMINOPHEN AND CODEINE PHOSPHATE 120; 12 MG/5ML; MG/5ML
1 SOLUTION ORAL DAILY
Qty: 30 TABLET | Refills: 3 | Status: SHIPPED | OUTPATIENT
Start: 2023-01-23 | End: 2023-08-17 | Stop reason: SDUPTHER

## 2023-01-23 NOTE — PROGRESS NOTES
[facility] OFFICE VISIT NOTE  Ninfa Fernandez  [id number]  01/24/2023      Chief Complaint: follow up    HPI    27 y.o. female    Contraception management  - previously on combined OCPs, then had DVT  - after DVT placed on minipill, stopped months ago for unknown reason    - not sexually active since ~ 9/2022  - LMP ~ 11/2022  - requests screening for STIs  - denies dysuria, hematuria, urinary frequency, foul or colored discharge     Rash to chest x 2 years  - intermittent, associated with pruritus  - no change in detergent or soap  - tries to avoid perfumes  - hx of contact and irritant dermatitis to arms and hands, reports that it looks similar     ROS:  GENERAL: + insomnia   CARDIOVASCULAR: No chest pain  RESPIRATORY: No shortness of breath  GASTROINTESTINAL: No abdominal pain    PE:  Vitals:    01/23/23 1044   BP: 121/74   Pulse: 68   Temp: 98.1 °F (36.7 °C)     General: appears well, in no acute distress   Eye: no scleral icterus   Respiratory: clear to auscultation bilaterally, nonlabored respirations   Cardiovascular: regular rate and rhythm without murmurs, no edema in bilateral lower extremities   Gastrointestinal: soft, non-tender, non-distended, bowel sounds present   Genitourinary: no suprapubic tenderness   Musculoskeletal: no gross deformities observed, gait wnl    Integumentary: hyperpigmented macular rash to chest, no excoriation present, no bleeding or discharge   Speculum Exam: vaginal vault with thin clear/ white discharge, nonodorous, no lesions/masses seen, cervical os visualized as closed, no lesions/masses.   Bimanual Exam: no cervical motion tenderness, uterus freely mobile and normal, no adnexal masses, nontender exam.   Note: RN chaperone present for entirety of exam.    Assessment:   1. Encounter for contraceptive management, unspecified type    2. Cervical cancer screening    3. Screening examination for STD (sexually transmitted disease)    4. Class 3 severe obesity with body mass index  (BMI) of 40.0 to 44.9 in adult, unspecified obesity type, unspecified whether serious comorbidity present    5. Insomnia, unspecified type        Plan:  - UPT negative  - norethindrone daily prescribed by colleague, [facility] category 2 (hx DVT), VTE precautions   - Pap smear, wet prep, CT/GT  - referral to nutrition, 150 minutes of moderate intensity exercise/week, dietary modifications  - melatonin 5 mg 1 hr prior to bedtime, sleep hygiene discussed  - labs to include: HIV, Syphilis Ab, Hep C Ab    Return to clinic in 6 months for follow up, or sooner if needed.     Karen Juárez M.D. [facility]

## 2023-01-25 LAB — PSYCHE PATHOLOGY RESULT: NORMAL

## 2023-01-27 ENCOUNTER — DOCUMENTATION ONLY (OUTPATIENT)
Dept: HEPATOLOGY | Facility: HOSPITAL | Age: 28
End: 2023-01-27
Payer: MEDICAID

## 2023-01-28 NOTE — PROGRESS NOTES
Called patient to discuss recent results. PAP NIL. HIV, Hep C and Syphilis Ab non reactive. CT/GC negative. Wet prep with rare clue cells. Patient denies any genitourinary symptoms to include dysuria, hematuria, irritation, itching or discharge. Shared decision making to hold off on treatment since asymptomatic and clue cells rare. Advised patient to avoid fragrances and other harsh cleaning products to genital area. Encouraged patient to call office if any concerns arise prior to next appointment. All questions answered. Patient expressed understanding.     Karen Juárez MD  [employer]  Resident, [facility]

## 2023-04-20 ENCOUNTER — OFFICE VISIT (OUTPATIENT)
Dept: URGENT CARE | Facility: CLINIC | Age: 28
End: 2023-04-20
Payer: MEDICAID

## 2023-04-20 VITALS
DIASTOLIC BLOOD PRESSURE: 72 MMHG | HEIGHT: 64 IN | RESPIRATION RATE: 16 BRPM | OXYGEN SATURATION: 100 % | WEIGHT: 257 LBS | SYSTOLIC BLOOD PRESSURE: 111 MMHG | BODY MASS INDEX: 43.87 KG/M2 | HEART RATE: 60 BPM | TEMPERATURE: 98 F

## 2023-04-20 DIAGNOSIS — N91.5 OLIGOMENORRHEA, UNSPECIFIED TYPE: ICD-10-CM

## 2023-04-20 DIAGNOSIS — Z32.02 NEGATIVE PREGNANCY TEST: ICD-10-CM

## 2023-04-20 DIAGNOSIS — Z20.2 POSSIBLE EXPOSURE TO STD: ICD-10-CM

## 2023-04-20 DIAGNOSIS — N89.8 VAGINAL ITCHING: Primary | ICD-10-CM

## 2023-04-20 LAB
B-HCG UR QL: NEGATIVE
C TRACH DNA SPEC QL NAA+PROBE: NOT DETECTED
CTP QC/QA: YES
HAV IGM SERPL QL IA: NONREACTIVE
HBV CORE IGM SERPL QL IA: NONREACTIVE
HBV SURFACE AG SERPL QL IA: NONREACTIVE
HCV AB SERPL QL IA: NONREACTIVE
HIV 1+2 AB+HIV1 P24 AG SERPL QL IA: NONREACTIVE
N GONORRHOEA DNA SPEC QL NAA+PROBE: NOT DETECTED
T PALLIDUM AB SER QL: NONREACTIVE

## 2023-04-20 PROCEDURE — 80074 ACUTE HEPATITIS PANEL: CPT

## 2023-04-20 PROCEDURE — 87661 TRICHOMONAS VAGINALIS AMPLIF: CPT | Mod: 90

## 2023-04-20 PROCEDURE — 99213 PR OFFICE/OUTPT VISIT, EST, LEVL III, 20-29 MIN: ICD-10-PCS | Mod: S$PBB,,,

## 2023-04-20 PROCEDURE — 87389 HIV-1 AG W/HIV-1&-2 AB AG IA: CPT

## 2023-04-20 PROCEDURE — 81025 URINE PREGNANCY TEST: CPT | Mod: PBBFAC

## 2023-04-20 PROCEDURE — 36415 COLL VENOUS BLD VENIPUNCTURE: CPT

## 2023-04-20 PROCEDURE — 86780 TREPONEMA PALLIDUM: CPT

## 2023-04-20 PROCEDURE — 99214 OFFICE O/P EST MOD 30 MIN: CPT | Mod: PBBFAC

## 2023-04-20 PROCEDURE — 87591 N.GONORRHOEAE DNA AMP PROB: CPT

## 2023-04-20 PROCEDURE — 99213 OFFICE O/P EST LOW 20 MIN: CPT | Mod: S$PBB,,,

## 2023-04-20 RX ORDER — FLUCONAZOLE 150 MG/1
150 TABLET ORAL DAILY
Qty: 2 TABLET | Refills: 0 | Status: SHIPPED | OUTPATIENT
Start: 2023-04-20 | End: 2023-04-22

## 2023-04-20 NOTE — PATIENT INSTRUCTIONS
No sex until all results are known. The clinic will ONLY call you for POSITIVE = ABNORMAL results. We will not call you to tell you tests are NEGATIVE = NORMAL. If you need medication to treat any conditions, it was either prescribed to you today and sent to your pharmacy, or it will be sent when providers view abnormal results.The clinic will call with ABNORMAL results and a treatment plan. Always use condoms.  Partners will need treatment for any POSITIVE STDs on your testing. They have to have their own visit, we do not automatically treat them.

## 2023-04-20 NOTE — PROGRESS NOTES
"Subjective:      Patient ID: Ninfa Fernandez is a 28 y.o. female.    Vitals:  height is 5' 4" (1.626 m) and weight is 116.6 kg (257 lb). Her temperature is 98.1 °F (36.7 °C). Her blood pressure is 111/72 and her pulse is 60. Her respiration is 16 and oxygen saturation is 100%.     Chief Complaint: Physical Exam (Entered by patient) and Vaginal Itching (C/O vaginal itching x 3 days. No discharge)    States she had unprotected sex with ex-boyfriend and would like STD testing. Report vaginal itching twice in the last 2 weeks. Denies any vaginal discharge. Reports irregular menstrual cycle, unsure of her last menstrual cycle.    Vaginal Itching  The patient's primary symptoms include genital itching and missed menses. The patient's pertinent negatives include no genital odor, pelvic pain or vaginal discharge. This is a new problem. The current episode started 1 to 4 weeks ago. The problem occurs intermittently. The problem has been unchanged. The patient is experiencing no pain. Pregnant now: unsure. Pertinent negatives include no dysuria. She has tried nothing for the symptoms. She is sexually active. Her menstrual history has been irregular.     Genitourinary:  Positive for missed menses. Negative for dysuria, vaginal discharge and pelvic pain.    Objective:     Physical Exam   Constitutional: She is oriented to person, place, and time.   Neck: Neck supple.   Cardiovascular: Normal rate, regular rhythm, normal heart sounds and normal pulses.   Pulmonary/Chest: Effort normal and breath sounds normal.   Abdominal: Normal appearance.   Genitourinary:    No vaginal discharge.           Comments: Vaginal itchy     Musculoskeletal: Normal range of motion.         General: Normal range of motion.   Neurological: no focal deficit. She is alert and oriented to person, place, and time.   Skin: Skin is warm and dry.   Psychiatric: Her behavior is normal. Mood normal.   Nursing note and vitals reviewed.    Assessment:     1. " Vaginal itching    2. Oligomenorrhea, unspecified type    3. Possible exposure to STD    4. Negative pregnancy test        Results for orders placed or performed in visit on 04/20/23   Chlamydia/GC, PCR    Specimen: Urine   Result Value Ref Range    Chlamydia trachomatis PCR Not Detected Not Detected    N. gonorrhea PCR Not Detected Not Detected   T.vaginalisisc, Amplified RNA   Result Value Ref Range    SOURCE: URINE     T.vaginalis, Misc, amplified RNA Negative Negative   SYPHILIS ANTIBODY (WITH REFLEX RPR)   Result Value Ref Range    Syphilis Antibody Nonreactive Nonreactive, Equivocal   Hepatitis Panel, Acute   Result Value Ref Range    Hepatitis A IgM Nonreactive Nonreactive    Hepatitis B Core IgM Nonreactive Nonreactive    Hepatitis B Surface Antigen Nonreactive Nonreactive    Hepatitis C Antibody Nonreactive Nonreactive   HIV 1/2 Ag/Ab (4th Gen)   Result Value Ref Range    HIV Nonreactive Nonreactive   Pathologist Interpretation   Result Value Ref Range    Pathology Review       No serological evidence of recent or past hepatitis A, B, or C infection.    Suleiman Prasad M.D.    POCT urine pregnancy   Result Value Ref Range    POC Preg Test, Ur Negative Negative     Acceptable Yes       Plan:       Vaginal itching  -     fluconazole (DIFLUCAN) 150 MG Tab; Take 1 tablet (150 mg total) by mouth once daily. Take 1 pill today and then take another pill in 3 days (72 hours) for 2 days  Dispense: 2 tablet; Refill: 0    Oligomenorrhea, unspecified type  -     POCT urine pregnancy    Possible exposure to STD  -     Chlamydia/GC, PCR  -     T.vaginalisisc, Amplified RNA  -     Cancel: SYPHILIS ANTIBODY (WITH REFLEX RPR); Future; Expected date: 04/20/2023  -     Cancel: HIV 1/2 Ag/Ab (4th Gen); Future; Expected date: 04/20/2023  -     Cancel: Hepatitis Panel, Acute; Future; Expected date: 04/20/2023  -     SYPHILIS ANTIBODY (WITH REFLEX RPR)  -     Hepatitis Panel, Acute  -     HIV 1/2 Ag/Ab (4th  Gen)  -     Pathologist Interpretation    Negative pregnancy test      No sex until all results are known. The clinic will ONLY call you for POSITIVE = ABNORMAL results. We will not call you to tell you tests are NEGATIVE = NORMAL. If you need medication to treat any conditions, it was either prescribed to you today and sent to your pharmacy, or it will be sent when providers view abnormal results.The clinic will call with ABNORMAL results and a treatment plan. Always use condoms.  Partners will need treatment for any POSITIVE STDs on your testing. They have to have their own visit, we do not automatically treat them.

## 2023-04-21 LAB
PATH REV: NORMAL
SPECIMEN SOURCE: NORMAL
T VAGINALIS RRNA SPEC QL NAA+PROBE: NEGATIVE

## 2023-06-13 ENCOUNTER — PATIENT MESSAGE (OUTPATIENT)
Dept: RESEARCH | Facility: HOSPITAL | Age: 28
End: 2023-06-13
Payer: MEDICAID

## 2023-06-20 ENCOUNTER — PATIENT MESSAGE (OUTPATIENT)
Dept: RESEARCH | Facility: HOSPITAL | Age: 28
End: 2023-06-20
Payer: MEDICAID

## 2023-07-05 ENCOUNTER — PATIENT MESSAGE (OUTPATIENT)
Dept: RESEARCH | Facility: HOSPITAL | Age: 28
End: 2023-07-05
Payer: MEDICAID

## 2023-07-19 ENCOUNTER — PATIENT MESSAGE (OUTPATIENT)
Dept: RESEARCH | Facility: HOSPITAL | Age: 28
End: 2023-07-19
Payer: MEDICAID

## 2023-07-19 ENCOUNTER — OFFICE VISIT (OUTPATIENT)
Dept: URGENT CARE | Facility: CLINIC | Age: 28
End: 2023-07-19
Payer: MEDICAID

## 2023-07-19 VITALS
WEIGHT: 261.44 LBS | DIASTOLIC BLOOD PRESSURE: 69 MMHG | HEIGHT: 64 IN | BODY MASS INDEX: 44.63 KG/M2 | RESPIRATION RATE: 16 BRPM | HEART RATE: 71 BPM | TEMPERATURE: 98 F | OXYGEN SATURATION: 100 % | SYSTOLIC BLOOD PRESSURE: 101 MMHG

## 2023-07-19 DIAGNOSIS — R10.9 ABDOMINAL PAIN, UNSPECIFIED ABDOMINAL LOCATION: Primary | ICD-10-CM

## 2023-07-19 DIAGNOSIS — R19.7 DIARRHEA, UNSPECIFIED TYPE: ICD-10-CM

## 2023-07-19 LAB
B-HCG UR QL: NEGATIVE
BILIRUB UR QL STRIP: NEGATIVE
CTP QC/QA: YES
GLUCOSE UR QL STRIP: NEGATIVE
KETONES UR QL STRIP: NEGATIVE
LEUKOCYTE ESTERASE UR QL STRIP: POSITIVE
PH, POC UA: 7
POC BLOOD, URINE: NEGATIVE
POC NITRATES, URINE: NEGATIVE
PROT UR QL STRIP: NEGATIVE
SP GR UR STRIP: 1.02 (ref 1–1.03)
UROBILINOGEN UR STRIP-ACNC: ABNORMAL (ref 0.1–1.1)

## 2023-07-19 PROCEDURE — 81003 URINALYSIS AUTO W/O SCOPE: CPT | Mod: PBBFAC | Performed by: FAMILY MEDICINE

## 2023-07-19 PROCEDURE — 81025 URINE PREGNANCY TEST: CPT | Mod: PBBFAC | Performed by: FAMILY MEDICINE

## 2023-07-19 PROCEDURE — 99213 OFFICE O/P EST LOW 20 MIN: CPT | Mod: S$PBB,,, | Performed by: FAMILY MEDICINE

## 2023-07-19 PROCEDURE — 99213 PR OFFICE/OUTPT VISIT, EST, LEVL III, 20-29 MIN: ICD-10-PCS | Mod: S$PBB,,, | Performed by: FAMILY MEDICINE

## 2023-07-19 PROCEDURE — 99214 OFFICE O/P EST MOD 30 MIN: CPT | Mod: PBBFAC | Performed by: FAMILY MEDICINE

## 2023-07-19 PROCEDURE — 87088 URINE BACTERIA CULTURE: CPT | Performed by: FAMILY MEDICINE

## 2023-07-19 RX ORDER — ONDANSETRON 8 MG/1
8 TABLET, ORALLY DISINTEGRATING ORAL EVERY 8 HOURS PRN
Qty: 10 TABLET | Refills: 0 | Status: SHIPPED | OUTPATIENT
Start: 2023-07-19

## 2023-07-19 NOTE — PROGRESS NOTES
"Subjective:      Patient ID: Ninfa Fernandez is a 28 y.o. female.    Vitals:  height is 5' 3.78" (1.62 m) and weight is 118.6 kg (261 lb 7.5 oz). Her temperature is 98.3 °F (36.8 °C). Her blood pressure is 101/69 and her pulse is 71. Her respiration is 16 and oxygen saturation is 100%.     Chief Complaint: Abdominal Pain (Entered by patient  x 1day)    Patient with 1 day of abdominal cramps, loose stool, mild nausea.  No dysuria or increased frequency, no vaginal discharge or bleeding.  No fever.      Gastrointestinal:  Positive for abdominal pain.    Objective:     Physical Exam   Constitutional:  Non-toxic appearance. She does not appear ill. No distress.   Neck: Neck supple.   Abdominal: Normal appearance. She exhibits no distension. flat abdomen There is no abdominal tenderness. There is no rebound, no guarding, no left CVA tenderness and no right CVA tenderness.   Neurological: no focal deficit. She is alert.   Skin: Skin is warm, dry and not diaphoretic.   Psychiatric: Her behavior is normal. Mood normal.   Nursing note and vitals reviewed.  Results for orders placed or performed in visit on 07/19/23   POCT Urinalysis, Dipstick, Automated, W/O Scope   Result Value Ref Range    POC Blood, Urine Negative Negative    POC Bilirubin, Urine Negative Negative    POC Urobilinogen, Urine 1.0 e.u./dl 0.1 - 1.1    POC Ketones, Urine Negative Negative    POC Protein, Urine Negative Negative    POC Nitrates, Urine Negative Negative    POC Glucose, Urine Negative Negative    pH, UA 7.0     POC Specific Gravity, Urine 1.020 1.003 - 1.029    POC Leukocytes, Urine Positive (A) Negative   POCT urine pregnancy   Result Value Ref Range    POC Preg Test, Ur Negative Negative     Acceptable Yes        Assessment:     1. Abdominal pain, unspecified abdominal location    2. Diarrhea, unspecified type        Plan:   Will send urine culture and notify if indicated.  However, currently symptoms are most consistent with " gastroenteritis.  Prescription for Zofran for nausea, increase fluids, slowly advance diet.  Instructed to return to clinic if she develops dysuria or increased frequency, hematuria, fever    Abdominal pain, unspecified abdominal location  -     POCT Urinalysis, Dipstick, Automated, W/O Scope  -     POCT urine pregnancy  -     Urine culture  -     ondansetron (ZOFRAN-ODT) 8 MG TbDL; Take 1 tablet (8 mg total) by mouth every 8 (eight) hours as needed (nausea).  Dispense: 10 tablet; Refill: 0    Diarrhea, unspecified type

## 2023-07-19 NOTE — LETTER
July 19, 2023      [facility]  [address] [address]  Phone: [phone]       Patient: Ninfa Fernandez   YOB: 1995  Date of Visit: 07/19/2023    To Whom It May Concern:    Mike Fernandez  was at [facility] on 07/19/2023. The patient may return to work/school on July 20 2023 with no restrictions. If you have any questions or concerns, or if I can be of further assistance, please do not hesitate to contact me.    Sincerely,    MANJU CRANDALL MD

## 2023-07-21 LAB
BACTERIA UR CULT: ABNORMAL

## 2023-07-22 ENCOUNTER — TELEPHONE (OUTPATIENT)
Dept: URGENT CARE | Facility: CLINIC | Age: 28
End: 2023-07-22
Payer: MEDICAID

## 2023-07-22 NOTE — TELEPHONE ENCOUNTER
Patient returned call, states she is feeling better. Explained that the specimen possibly was contaminated. She stated she was nauseated but took Zofran and is feeling much better. Advised patient to contact office if she has further questions or concerns, she voiced understanding.

## 2023-07-22 NOTE — TELEPHONE ENCOUNTER
----- Message from JAYASHREE Clemons sent at 7/21/2023  5:34 PM CDT -----  Call to check with patient to see if she is getting better.It's possible that specimen was contaminated. There may be no need for treatment.

## 2023-07-22 NOTE — TELEPHONE ENCOUNTER
Attempted to reach patient by phone. A male answered the phone and was put on speaker phone. I repeated said hello, in which I got no response, the line was disconnected. Attempted to call again, voicemail came on, message left to contact office.

## 2023-07-24 ENCOUNTER — PATIENT MESSAGE (OUTPATIENT)
Dept: RESEARCH | Facility: HOSPITAL | Age: 28
End: 2023-07-24
Payer: MEDICAID

## 2023-08-17 RX ORDER — ACETAMINOPHEN AND CODEINE PHOSPHATE 120; 12 MG/5ML; MG/5ML
1 SOLUTION ORAL DAILY
Qty: 30 TABLET | Refills: 5 | Status: SHIPPED | OUTPATIENT
Start: 2023-08-17 | End: 2024-02-03

## 2023-08-17 NOTE — TELEPHONE ENCOUNTER
Pt called, upset, stated she had left msgs earlier this week trying to get her birth control filled. Stated she felt like she was 'getting the run around'. Advised pt that I would send refill request high priority to PCP and staff doctor and to call and ask for me if needed tomorrow. Please advise. Thank you!

## 2023-11-14 ENCOUNTER — TELEPHONE (OUTPATIENT)
Dept: URGENT CARE | Facility: CLINIC | Age: 28
End: 2023-11-14

## 2023-11-14 ENCOUNTER — OFFICE VISIT (OUTPATIENT)
Dept: URGENT CARE | Facility: CLINIC | Age: 28
End: 2023-11-14
Payer: MEDICAID

## 2023-11-14 VITALS
HEART RATE: 67 BPM | DIASTOLIC BLOOD PRESSURE: 82 MMHG | TEMPERATURE: 98 F | WEIGHT: 263.63 LBS | SYSTOLIC BLOOD PRESSURE: 122 MMHG | RESPIRATION RATE: 16 BRPM | BODY MASS INDEX: 46.71 KG/M2 | HEIGHT: 63 IN | OXYGEN SATURATION: 100 %

## 2023-11-14 DIAGNOSIS — R09.89 SYMPTOMS OF UPPER RESPIRATORY INFECTION (URI): Primary | ICD-10-CM

## 2023-11-14 LAB
CTP QC/QA: YES
FLUAV AG UPPER RESP QL IA.RAPID: NOT DETECTED
FLUBV AG UPPER RESP QL IA.RAPID: NOT DETECTED
MOLECULAR STREP A: NEGATIVE
RSV A 5' UTR RNA NPH QL NAA+PROBE: NOT DETECTED
SARS-COV-2 RNA RESP QL NAA+PROBE: NOT DETECTED

## 2023-11-14 PROCEDURE — 87651 STREP A DNA AMP PROBE: CPT | Mod: PBBFAC

## 2023-11-14 PROCEDURE — 99213 OFFICE O/P EST LOW 20 MIN: CPT | Mod: S$PBB,,,

## 2023-11-14 PROCEDURE — 0241U COVID/RSV/FLU A&B PCR: CPT

## 2023-11-14 PROCEDURE — 99213 PR OFFICE/OUTPT VISIT, EST, LEVL III, 20-29 MIN: ICD-10-PCS | Mod: S$PBB,,,

## 2023-11-14 PROCEDURE — 99213 OFFICE O/P EST LOW 20 MIN: CPT | Mod: PBBFAC

## 2023-11-14 RX ORDER — LORATADINE 10 MG/1
10 TABLET ORAL DAILY
Qty: 30 TABLET | Refills: 0 | Status: SHIPPED | OUTPATIENT
Start: 2023-11-14 | End: 2023-12-14

## 2023-11-14 RX ORDER — GUAIFENESIN/DEXTROMETHORPHAN 100-10MG/5
5 SYRUP ORAL EVERY 6 HOURS PRN
Qty: 118 ML | Refills: 0 | Status: SHIPPED | OUTPATIENT
Start: 2023-11-14 | End: 2023-11-24

## 2023-11-14 RX ORDER — FLUTICASONE PROPIONATE 50 MCG
2 SPRAY, SUSPENSION (ML) NASAL DAILY
Qty: 18.2 ML | Refills: 0 | Status: SHIPPED | OUTPATIENT
Start: 2023-11-14

## 2023-11-14 NOTE — PROGRESS NOTES
"Subjective:      Patient ID: Ninfa Fernandez is a 28 y.o. female.    Vitals:  height is 5' 3" (1.6 m) and weight is 119.6 kg (263 lb 9.6 oz). Her temperature is 98.4 °F (36.9 °C). Her blood pressure is 122/82 and her pulse is 67. Her respiration is 16 and oxygen saturation is 100%.     Chief Complaint: URI (HA, body aches, watery eyes, congestion since 0400. 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 Works at a nursing home.     URI   Associated symptoms include congestion.       Constitution: Positive for fatigue.   HENT:  Positive for congestion.    Neck: neck negative.   Cardiovascular: Negative.    Eyes: Negative.    Respiratory: Negative.     Gastrointestinal: Negative.    Genitourinary: Negative.    Musculoskeletal: Negative.    Neurological: Negative.       Objective:     Physical Exam   Constitutional: She is oriented to person, place, and time. normal  HENT:   Head: Normocephalic.   Ears:   Right Ear: Tympanic membrane, external ear and ear canal normal.   Left Ear: Tympanic membrane, external ear and ear canal normal.   Nose: Congestion present.   Mouth/Throat: Uvula is midline, oropharynx is clear and moist and mucous membranes are normal. Mucous membranes are moist. Oropharynx is clear.   Eyes: Pupils are equal, round, and reactive to light.   Neck: Neck supple.   Cardiovascular: Normal rate, regular rhythm, normal heart sounds and normal pulses.   Pulmonary/Chest: Effort normal and breath sounds normal.   Abdominal: Normal appearance. Soft.   Musculoskeletal: Normal range of motion.         General: Normal range of motion.   Neurological: She is alert and oriented to person, place, and time.   Skin: Skin is warm and dry.   Vitals reviewed.    Results for orders placed or performed in visit on 11/14/23   POCT Strep A, Molecular   Result Value Ref Range    Molecular Strep A, POC Negative Negative     " Acceptable Yes        Assessment:     1. Symptoms of upper respiratory infection (URI)        Plan:       Symptoms of upper respiratory infection (URI)  -     COVID/RSV/FLU A&B PCR; Future; Expected date: 11/14/2023  -     POCT Strep A, Molecular  -     fluticasone propionate (FLONASE) 50 mcg/actuation nasal spray; 2 sprays (100 mcg total) by Each Nostril route once daily.  Dispense: 18.2 mL; Refill: 0  -     loratadine (CLARITIN) 10 mg tablet; Take 1 tablet (10 mg total) by mouth once daily.  Dispense: 30 tablet; Refill: 0  -     dextromethorphan-guaiFENesin  mg/5 ml (ROBITUSSIN-DM)  mg/5 mL liquid; Take 5 mLs by mouth every 6 (six) hours as needed (cough).  Dispense: 118 mL; Refill: 0        Please drink plenty of fluids.  Please get plenty of rest.    Take over the counter Tylenol (Acetaminophen) and/or Motrin (Ibuprofen) as directed for control of pain and/or fever.  Please follow up with your primary care doctor.     ER precautions given, patient verbalized understanding.     Please see provided patient education for guidance.    Follow up with PCP or return to clinic if symptoms worsen or do not improve.

## 2023-11-14 NOTE — TELEPHONE ENCOUNTER
----- Message from Haily Duckworth NP sent at 11/14/2023  1:22 PM CST -----  Please notify patient they are negative for covid, flu, rsv, and strep.

## 2023-11-14 NOTE — LETTER
November 14, 2023      [facility]  [address] [address]  Phone: [phone]       Patient: Ninfa Fernandez   YOB: 1995  Date of Visit: 11/14/2023    To Whom It May Concern:    Mike Fernandez  was at [facility] on 11/14/2023. The patient may return to work/school on 11/17/23 with no restrictions. If you have any questions or concerns, or if I can be of further assistance, please do not hesitate to contact me.    Sincerely,    KENAY Duckworth NP

## 2023-12-01 ENCOUNTER — OFFICE VISIT (OUTPATIENT)
Dept: URGENT CARE | Facility: CLINIC | Age: 28
End: 2023-12-01
Payer: MEDICAID

## 2023-12-01 VITALS
DIASTOLIC BLOOD PRESSURE: 74 MMHG | RESPIRATION RATE: 18 BRPM | OXYGEN SATURATION: 98 % | HEART RATE: 70 BPM | BODY MASS INDEX: 45.47 KG/M2 | WEIGHT: 266.31 LBS | SYSTOLIC BLOOD PRESSURE: 118 MMHG | HEIGHT: 64 IN | TEMPERATURE: 99 F

## 2023-12-01 DIAGNOSIS — M79.18 MUSCULOSKELETAL PAIN: Primary | ICD-10-CM

## 2023-12-01 PROCEDURE — 99214 OFFICE O/P EST MOD 30 MIN: CPT | Mod: PBBFAC | Performed by: FAMILY MEDICINE

## 2023-12-01 PROCEDURE — 99213 PR OFFICE/OUTPT VISIT, EST, LEVL III, 20-29 MIN: ICD-10-PCS | Mod: S$PBB,,, | Performed by: FAMILY MEDICINE

## 2023-12-01 PROCEDURE — 99213 OFFICE O/P EST LOW 20 MIN: CPT | Mod: S$PBB,,, | Performed by: FAMILY MEDICINE

## 2023-12-01 RX ORDER — DICLOFENAC SODIUM 75 MG/1
75 TABLET, DELAYED RELEASE ORAL 2 TIMES DAILY
Qty: 30 TABLET | Refills: 1 | Status: SHIPPED | OUTPATIENT
Start: 2023-12-01

## 2023-12-01 RX ORDER — CYCLOBENZAPRINE HCL 5 MG
5 TABLET ORAL NIGHTLY PRN
Qty: 20 TABLET | Refills: 0 | Status: SHIPPED | OUTPATIENT
Start: 2023-12-01
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
## 2023-12-01 NOTE — LETTER
December 1, 2023      [facility]  [address] [address]  Phone: [phone]       Patient: Ninfa Fernandez   YOB: 1995  Date of Visit: 12/01/2023    To Whom It May Concern:    Mike Fernandez  was at [facility] on 12/01/2023. The patient may return to work/school on DEC 2 2023 with no restrictions. If you have any questions or concerns, or if I can be of further assistance, please do not hesitate to contact me.    Sincerely,    MANJU CRANDALL MD

## 2024-02-03 RX ORDER — NORETHINDRONE 0.35 MG/1
1 TABLET ORAL
Qty: 28 TABLET | Refills: 5 | Status: SHIPPED | OUTPATIENT
Start: 2024-02-03

## 2024-04-04 ENCOUNTER — OFFICE VISIT (OUTPATIENT)
Dept: FAMILY MEDICINE | Facility: CLINIC | Age: 29
End: 2024-04-04
Payer: MEDICAID

## 2024-04-04 VITALS
SYSTOLIC BLOOD PRESSURE: 111 MMHG | OXYGEN SATURATION: 99 % | HEIGHT: 64 IN | HEART RATE: 82 BPM | TEMPERATURE: 98 F | RESPIRATION RATE: 20 BRPM | BODY MASS INDEX: 45.58 KG/M2 | DIASTOLIC BLOOD PRESSURE: 73 MMHG | WEIGHT: 267 LBS

## 2024-04-04 DIAGNOSIS — B36.0 TINEA VERSICOLOR: Primary | ICD-10-CM

## 2024-04-04 PROCEDURE — 99214 OFFICE O/P EST MOD 30 MIN: CPT | Mod: PBBFAC | Performed by: STUDENT IN AN ORGANIZED HEALTH CARE EDUCATION/TRAINING PROGRAM

## 2024-04-04 RX ORDER — SELENIUM SULFIDE 23 MG/ML
1 SHAMPOO TOPICAL DAILY
Qty: 180 ML | Refills: 2 | Status: SHIPPED | OUTPATIENT
Start: 2024-04-04

## 2024-04-04 NOTE — PATIENT INSTRUCTIONS
Selenium sulfide shampoo to affected area. Lather before applying. Keep on area for 10 minutes prior to rinsing. Apply daily for one week. If improvement continued once monthly.

## 2024-04-06 NOTE — PROGRESS NOTES
"[facility] OFFICE VISIT NOTE  Ninfa Fernandez  [id number]  04/06/2024      Chief Complaint: Rash (Generalized rash to chest, onset "a couple of years", patient states it has gotten bigger)      HPI    29 y.o. female presenting to [facility] for evaluation of skin rash to chest. Onset years ago. More noticeable in the sun. Has increased in size since onset. Denies known irritants. Denies use of fragranced products. No change in detergents.  Denies pruritus, bleeding or discharge.       ROS:  As per HPI     PE:  Vitals:    04/04/24 1550   BP: 111/73   Pulse: 82   Resp: 20   Temp: 98.3 °F (36.8 °C)     General: appears well, in no acute distress   Skin: numerous well marginated round hypopigmented macules that coalesce into patches with surrounding hyperpigmentation between breasts and to posterior neck, acanthosis nigricans to posterior neck      Assessment:   1. Tinea versicolor        Plan:  - selenium sulfide (SELRX) 2.3 % shampoo daily x 1 week then monthly   - patient instructed to lather before applying then keep on area for 10 minutes prior to rinsing     Return to clinic in 1 month for follow up rash and annual wellness, or sooner if needed.     Karen Juárez M.D. HO-III  [facility]       "

## 2024-08-05 ENCOUNTER — OFFICE VISIT (OUTPATIENT)
Dept: FAMILY MEDICINE | Facility: CLINIC | Age: 29
End: 2024-08-05
Payer: COMMERCIAL

## 2024-08-05 VITALS
DIASTOLIC BLOOD PRESSURE: 79 MMHG | OXYGEN SATURATION: 100 % | SYSTOLIC BLOOD PRESSURE: 111 MMHG | TEMPERATURE: 98 F | WEIGHT: 267 LBS | HEIGHT: 64 IN | HEART RATE: 68 BPM | BODY MASS INDEX: 45.58 KG/M2

## 2024-08-05 DIAGNOSIS — Z30.41 ENCOUNTER FOR SURVEILLANCE OF CONTRACEPTIVE PILLS: Primary | ICD-10-CM

## 2024-08-05 LAB
B-HCG UR QL: NEGATIVE
CTP QC/QA: YES

## 2024-08-05 PROCEDURE — 99213 OFFICE O/P EST LOW 20 MIN: CPT | Mod: PBBFAC

## 2024-08-05 PROCEDURE — 81025 URINE PREGNANCY TEST: CPT | Mod: PBBFAC

## 2024-08-05 RX ORDER — NORETHINDRONE 0.35 MG/1
1 TABLET ORAL DAILY
Qty: 28 TABLET | Refills: 5 | Status: SHIPPED | OUTPATIENT
Start: 2024-08-05

## 2024-08-05 RX ORDER — NORETHINDRONE 0.35 MG/1
1 TABLET ORAL
Qty: 28 TABLET | Refills: 5 | OUTPATIENT
Start: 2024-08-05

## 2024-12-13 RX ORDER — NORETHINDRONE 0.35 MG/1
1 TABLET ORAL DAILY
Qty: 28 TABLET | Refills: 5 | Status: SHIPPED | OUTPATIENT
Start: 2024-12-13

## 2025-05-15 ENCOUNTER — HOSPITAL ENCOUNTER (EMERGENCY)
Facility: HOSPITAL | Age: 30
Discharge: HOME | End: 2025-05-15
Attending: INTERNAL MEDICINE
Payer: COMMERCIAL

## 2025-05-15 VITALS
WEIGHT: 275.69 LBS | BODY MASS INDEX: 48.85 KG/M2 | DIASTOLIC BLOOD PRESSURE: 87 MMHG | RESPIRATION RATE: 18 BRPM | HEIGHT: 63 IN | TEMPERATURE: 98 F | SYSTOLIC BLOOD PRESSURE: 145 MMHG | OXYGEN SATURATION: 99 % | HEART RATE: 71 BPM

## 2025-05-15 DIAGNOSIS — R05.9 COUGH: ICD-10-CM

## 2025-05-15 DIAGNOSIS — J06.9 URI WITH COUGH AND CONGESTION: Primary | ICD-10-CM

## 2025-05-15 LAB
B-HCG UR QL: NEGATIVE
CTP QC/QA: YES
FLUAV AG UPPER RESP QL IA.RAPID: NOT DETECTED
FLUBV AG UPPER RESP QL IA.RAPID: NOT DETECTED
RSV A 5' UTR RNA NPH QL NAA+PROBE: NOT DETECTED
SARS-COV-2 RNA RESP QL NAA+PROBE: NOT DETECTED

## 2025-05-15 PROCEDURE — 99283 EMERGENCY DEPT VISIT LOW MDM: CPT | Mod: 25

## 2025-05-15 PROCEDURE — 81025 URINE PREGNANCY TEST: CPT | Performed by: INTERNAL MEDICINE

## 2025-05-15 PROCEDURE — 0241U COVID/RSV/FLU A&B PCR: CPT | Performed by: INTERNAL MEDICINE

## 2025-05-15 RX ORDER — BENZONATATE 200 MG/1
200 CAPSULE ORAL 3 TIMES DAILY PRN
Qty: 30 CAPSULE | Refills: 0 | Status: SHIPPED | OUTPATIENT
Start: 2025-05-15 | End: 2025-05-25

## 2025-05-15 RX ORDER — GUAIFENESIN AND DEXTROMETHORPHAN HYDROBROMIDE 10; 100 MG/5ML; MG/5ML
5 SYRUP ORAL 4 TIMES DAILY PRN
Qty: 236 ML | Refills: 0 | Status: SHIPPED | OUTPATIENT
Start: 2025-05-15

## 2025-05-15 NOTE — Clinical Note
"Ninfa Fernandez (Brionna) was seen and treated in our emergency department on 5/15/2025.  She may return to work on 05/16/2025.       If you have any questions or concerns, please don't hesitate to call.       RN    "

## 2025-05-15 NOTE — ED PROVIDER NOTES
Encounter Date: 5/15/2025       History     Chief Complaint   Patient presents with    Cough     Coughing throughout triage process    Shortness of Breath     Reports with dry cough, SOB, and intermittent nausea x2 days. States has been taking cough drops with no relief.      Presents with cough for the last 2 days. 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 Denies chronic medical problems.     The history is provided by the patient.     Review of patient's allergies indicates:   Allergen Reactions    Penicillin Hives    Clindamycin      Other reaction(s): severe itching,dizziness, nausea    Sulfamethoxazole-trimethoprim Hives     Past Medical History:   Diagnosis Date    Known health problems: none     Obesity, unspecified      Past Surgical History:   Procedure Laterality Date    ADENOIDECTOMY      TONSILLECTOMY       Family History   Problem Relation Name Age of Onset    No Known Problems Mother      No Known Problems Father       Social History[1]  Review of Systems   Respiratory:  Positive for cough.    All other systems reviewed and are negative.      Physical Exam     Initial Vitals [05/15/25 0638]   BP Pulse Resp Temp SpO2   (!) 142/89 100 20 98.4 °F (36.9 °C) 99 %      MAP       --         Physical Exam    Nursing note and vitals reviewed.  Constitutional: She appears well-developed and well-nourished. No distress.   HENT:   Head: Normocephalic and atraumatic. Mouth/Throat: Oropharynx is clear and moist.   Eyes: Conjunctivae are normal. Pupils are equal, round, and reactive to light.   Neck: Neck supple. No JVD present.   Normal range of motion.  Cardiovascular:  Normal rate, regular rhythm, normal heart sounds and intact distal pulses.           Pulmonary/Chest: Breath sounds normal.   Abdominal: Abdomen is soft. Bowel sounds are normal. She exhibits no distension. There is no abdominal tenderness. There is no rebound and no guarding.   Musculoskeletal:         General: No edema. Normal  range of motion.      Cervical back: Normal range of motion and neck supple.     Neurological: She is alert and oriented to person, place, and time. She has normal strength. GCS score is 15. GCS eye subscore is 4. GCS verbal subscore is 5. GCS motor subscore is 6.   Skin: Skin is warm and dry. No rash noted.   Psychiatric: Thought content normal.         ED Course   Procedures  Labs Reviewed   COVID/RSV/FLU A&B PCR - Normal       Result Value    Influenza A PCR Not Detected      Influenza B PCR Not Detected      Respiratory Syncytial Virus PCR Not Detected      SARS-CoV-2 PCR Not Detected      Narrative:     The Xpert Xpress SARS-CoV-2/FLU/RSV plus is a rapid, multiplexed real-time PCR test intended for the simultaneous qualitative detection and differentiation of SARS-CoV-2, Influenza A, Influenza B, and respiratory syncytial virus (RSV) viral RNA in either nasopharyngeal swab or nasal swab specimens.         POCT URINE PREGNANCY    POC Preg Test, Ur Negative       Acceptable Yes            Imaging Results              X-Ray Chest PA And Lateral (Final result)  Result time 05/15/25 07:47:04      Final result by Karel Deshpande MD (05/15/25 07:47:04)                   Impression:      No acute chest disease is identified.      Electronically signed by: Karel Deshpande  Date:    05/15/2025  Time:    07:47               Narrative:    EXAMINATION:  XR CHEST PA AND LATERAL    CLINICAL HISTORY:  , Cough, unspecified.    COMPARISON:  May 29, 2021    FINDINGS:  No alveolar consolidation, effusion, or pneumothorax is seen.   The thoracic aorta is normal  cardiac silhouette, central pulmonary vessels and mediastinum are normal in size and are grossly unremarkable.   visualized osseous structures are grossly unremarkable.                                       Medications - No data to display  Medical Decision Making  Amount and/or Complexity of Data Reviewed  Labs: ordered. Decision-making details  documented in ED Course.  Radiology: ordered and independent interpretation performed. Decision-making details documented in ED Course.      Additional MDM:   Differential Diagnosis:   Other: The following diagnoses were also considered and will be evaluated: URI, Pneumonia and PE.    PERC Rule:   Age is greater than or equal to 50 = 0.0  Heart Rate is greater than or equal to 100 = 0.0  SaO2 on room air < 95% = 0.0  Unilateral leg swelling = 0.0  Hemoptysis = 0.0  Recent surgery or trauma = 0.0  Prior PE or DVT =  0.0  Hormone use = 1.0  PERC Score = 1                                     Clinical Impression:  Final diagnoses:  [R05.9] Cough  [J06.9] URI with cough and congestion (Primary)          ED Disposition Condition    Discharge Stable          ED Prescriptions       Medication Sig Dispense Start Date End Date Auth. Provider    dextromethorphan-guaiFENesin  mg/5 ml (ROBITUSSIN-DM)  mg/5 mL liquid Take 5 mLs by mouth 4 (four) times daily as needed (cough). 236 mL 5/15/2025 -- Lefty Johnston MD    benzonatate (TESSALON) 200 MG capsule Take 1 capsule (200 mg total) by mouth 3 (three) times daily as needed. 30 capsule 5/15/2025 5/25/2025 Lefty Johnston MD          Follow-up Information       Follow up With Specialties Details Why Contact Info    Rosalio Darling MD Family Medicine Schedule an appointment as soon as possible for a visit in 2 weeks  [address] [address]  [phone]      [facility] Emergency Medicine  If symptoms worsen [facility] [facility] [address] [phone]                   [1]   Social History  Tobacco Use    Smoking status: Former     Types: Cigarettes    Smokeless tobacco: Never   Substance Use Topics    Alcohol use: Yes     Comment: occ    Drug use: Never        Lefty Johnston MD  05/15/25 3340

## 2025-06-23 DIAGNOSIS — R09.89 SYMPTOMS OF UPPER RESPIRATORY INFECTION (URI): ICD-10-CM

## 2025-06-23 RX ORDER — NORETHINDRONE 0.35 MG/1
1 TABLET ORAL DAILY
Qty: 28 TABLET | Refills: 5 | Status: SHIPPED | OUTPATIENT
Start: 2025-06-23

## 2025-06-23 RX ORDER — LORATADINE 10 MG/1
10 TABLET ORAL DAILY
Qty: 30 TABLET | Refills: 0 | Status: SHIPPED | OUTPATIENT
Start: 2025-06-23 | End: 2025-07-23

## 2025-07-22 DIAGNOSIS — R09.89 SYMPTOMS OF UPPER RESPIRATORY INFECTION (URI): ICD-10-CM

## 2025-07-23 RX ORDER — LORATADINE 10 MG/1
10 TABLET ORAL DAILY
Qty: 30 TABLET | Refills: 0 | Status: SHIPPED | OUTPATIENT
Start: 2025-07-23 | End: 2025-08-22